# Patient Record
Sex: MALE | Race: WHITE | NOT HISPANIC OR LATINO | URBAN - METROPOLITAN AREA
[De-identification: names, ages, dates, MRNs, and addresses within clinical notes are randomized per-mention and may not be internally consistent; named-entity substitution may affect disease eponyms.]

---

## 2023-07-17 ENCOUNTER — OFFICE VISIT (OUTPATIENT)
Dept: PRIMARY CARE | Facility: CLINIC | Age: 58
End: 2023-07-17
Payer: COMMERCIAL

## 2023-07-17 ENCOUNTER — LAB (OUTPATIENT)
Dept: LAB | Facility: LAB | Age: 58
End: 2023-07-17
Payer: COMMERCIAL

## 2023-07-17 VITALS
SYSTOLIC BLOOD PRESSURE: 126 MMHG | BODY MASS INDEX: 24.64 KG/M2 | WEIGHT: 176 LBS | DIASTOLIC BLOOD PRESSURE: 74 MMHG | HEIGHT: 71 IN

## 2023-07-17 DIAGNOSIS — Z00.00 HEALTH CARE MAINTENANCE: ICD-10-CM

## 2023-07-17 DIAGNOSIS — Z12.5 ENCOUNTER FOR PROSTATE CANCER SCREENING: ICD-10-CM

## 2023-07-17 DIAGNOSIS — Z87.898 HISTORY OF URINARY FREQUENCY: ICD-10-CM

## 2023-07-17 DIAGNOSIS — I45.10 RBBB: ICD-10-CM

## 2023-07-17 PROBLEM — R19.7 DIARRHEA: Status: ACTIVE | Noted: 2023-07-17

## 2023-07-17 PROBLEM — S20.219A RIB CONTUSION: Status: ACTIVE | Noted: 2023-07-17

## 2023-07-17 PROBLEM — R10.9 ABDOMINAL PAIN: Status: ACTIVE | Noted: 2023-07-17

## 2023-07-17 PROBLEM — R53.83 FATIGUE: Status: ACTIVE | Noted: 2023-07-17

## 2023-07-17 PROBLEM — J20.9 ACUTE BRONCHITIS: Status: ACTIVE | Noted: 2023-07-17

## 2023-07-17 PROBLEM — R91.1 LUNG NODULE: Status: ACTIVE | Noted: 2023-07-17

## 2023-07-17 LAB
ALANINE AMINOTRANSFERASE (SGPT) (U/L) IN SER/PLAS: 14 U/L (ref 10–52)
ALBUMIN (G/DL) IN SER/PLAS: 4.7 G/DL (ref 3.4–5)
ALKALINE PHOSPHATASE (U/L) IN SER/PLAS: 75 U/L (ref 33–120)
ANION GAP IN SER/PLAS: 13 MMOL/L (ref 10–20)
APPEARANCE, URINE: CLEAR
ASPARTATE AMINOTRANSFERASE (SGOT) (U/L) IN SER/PLAS: 11 U/L (ref 9–39)
BILIRUBIN TOTAL (MG/DL) IN SER/PLAS: 0.6 MG/DL (ref 0–1.2)
BILIRUBIN, URINE: NEGATIVE
BLOOD, URINE: ABNORMAL
CALCIDIOL (25 OH VITAMIN D3) (NG/ML) IN SER/PLAS: 17 NG/ML
CALCIUM (MG/DL) IN SER/PLAS: 9.8 MG/DL (ref 8.6–10.6)
CARBON DIOXIDE, TOTAL (MMOL/L) IN SER/PLAS: 28 MMOL/L (ref 21–32)
CHLORIDE (MMOL/L) IN SER/PLAS: 105 MMOL/L (ref 98–107)
CHOLESTEROL (MG/DL) IN SER/PLAS: 146 MG/DL (ref 0–199)
CHOLESTEROL IN HDL (MG/DL) IN SER/PLAS: 50.5 MG/DL
CHOLESTEROL/HDL RATIO: 2.9
COBALAMIN (VITAMIN B12) (PG/ML) IN SER/PLAS: 276 PG/ML (ref 211–911)
COLOR, URINE: YELLOW
CREATININE (MG/DL) IN SER/PLAS: 0.92 MG/DL (ref 0.5–1.3)
ERYTHROCYTE DISTRIBUTION WIDTH (RATIO) BY AUTOMATED COUNT: 12.1 % (ref 11.5–14.5)
ERYTHROCYTE MEAN CORPUSCULAR HEMOGLOBIN CONCENTRATION (G/DL) BY AUTOMATED: 32.3 G/DL (ref 32–36)
ERYTHROCYTE MEAN CORPUSCULAR VOLUME (FL) BY AUTOMATED COUNT: 87 FL (ref 80–100)
ERYTHROCYTES (10*6/UL) IN BLOOD BY AUTOMATED COUNT: 5.7 X10E12/L (ref 4.5–5.9)
GFR MALE: >90 ML/MIN/1.73M2
GLUCOSE (MG/DL) IN SER/PLAS: 96 MG/DL (ref 74–99)
GLUCOSE, URINE: NEGATIVE MG/DL
HEMATOCRIT (%) IN BLOOD BY AUTOMATED COUNT: 49.8 % (ref 41–52)
HEMOGLOBIN (G/DL) IN BLOOD: 16.1 G/DL (ref 13.5–17.5)
KETONES, URINE: NEGATIVE MG/DL
LDL: 78 MG/DL (ref 0–99)
LEUKOCYTE ESTERASE, URINE: NEGATIVE
LEUKOCYTES (10*3/UL) IN BLOOD BY AUTOMATED COUNT: 11 X10E9/L (ref 4.4–11.3)
MUCUS, URINE: NORMAL /LPF
NITRITE, URINE: NEGATIVE
NRBC (PER 100 WBCS) BY AUTOMATED COUNT: 0 /100 WBC (ref 0–0)
PH, URINE: 5 (ref 5–8)
PLATELETS (10*3/UL) IN BLOOD AUTOMATED COUNT: 228 X10E9/L (ref 150–450)
POTASSIUM (MMOL/L) IN SER/PLAS: 4.8 MMOL/L (ref 3.5–5.3)
PROSTATE SPECIFIC ANTIGEN,SCREEN: 1.6 NG/ML (ref 0–4)
PROTEIN TOTAL: 7.4 G/DL (ref 6.4–8.2)
PROTEIN, URINE: NEGATIVE MG/DL
RBC, URINE: 1 /HPF (ref 0–5)
SODIUM (MMOL/L) IN SER/PLAS: 141 MMOL/L (ref 136–145)
SPECIFIC GRAVITY, URINE: 1.01 (ref 1–1.03)
TRIGLYCERIDE (MG/DL) IN SER/PLAS: 88 MG/DL (ref 0–149)
UREA NITROGEN (MG/DL) IN SER/PLAS: 10 MG/DL (ref 6–23)
UROBILINOGEN, URINE: <2 MG/DL (ref 0–1.9)
VLDL: 18 MG/DL (ref 0–40)
WBC, URINE: 1 /HPF (ref 0–5)

## 2023-07-17 PROCEDURE — 93000 ELECTROCARDIOGRAM COMPLETE: CPT | Performed by: INTERNAL MEDICINE

## 2023-07-17 PROCEDURE — 36415 COLL VENOUS BLD VENIPUNCTURE: CPT

## 2023-07-17 PROCEDURE — 80061 LIPID PANEL: CPT

## 2023-07-17 PROCEDURE — 87086 URINE CULTURE/COLONY COUNT: CPT

## 2023-07-17 PROCEDURE — 82607 VITAMIN B-12: CPT

## 2023-07-17 PROCEDURE — 81001 URINALYSIS AUTO W/SCOPE: CPT

## 2023-07-17 PROCEDURE — 80053 COMPREHEN METABOLIC PANEL: CPT

## 2023-07-17 PROCEDURE — 99396 PREV VISIT EST AGE 40-64: CPT | Performed by: INTERNAL MEDICINE

## 2023-07-17 PROCEDURE — 82306 VITAMIN D 25 HYDROXY: CPT

## 2023-07-17 PROCEDURE — 85027 COMPLETE CBC AUTOMATED: CPT

## 2023-07-17 PROCEDURE — 84153 ASSAY OF PSA TOTAL: CPT

## 2023-07-17 NOTE — PROGRESS NOTES
"Subjective   Patient ID: Junior Fontanez is a 57 y.o. male who presents for cpe.    HPI   Patient here for physical        PMH : tonsillectomy  SH: smoker , no etoh  FH : M CANCER , F NHL , GF DM   Review of Systems    Objective   /74   Ht 1.791 m (5' 10.5\")   Wt 79.8 kg (176 lb)   BMI 24.90 kg/m²     Physical Exam  Vitals reviewed.   Constitutional:       Appearance: Normal appearance.   HENT:      Head: Normocephalic and atraumatic.      Right Ear: Tympanic membrane, ear canal and external ear normal.      Left Ear: Tympanic membrane, ear canal and external ear normal.      Nose: Nose normal.      Mouth/Throat:      Pharynx: Oropharynx is clear.   Eyes:      Extraocular Movements: Extraocular movements intact.      Conjunctiva/sclera: Conjunctivae normal.      Pupils: Pupils are equal, round, and reactive to light.   Cardiovascular:      Rate and Rhythm: Normal rate and regular rhythm.      Pulses: Normal pulses.      Heart sounds: Normal heart sounds.   Pulmonary:      Effort: Pulmonary effort is normal.      Breath sounds: Normal breath sounds.   Abdominal:      General: Abdomen is flat. Bowel sounds are normal.      Palpations: Abdomen is soft.   Musculoskeletal:      Cervical back: Normal range of motion and neck supple.   Skin:     General: Skin is warm and dry.   Neurological:      General: No focal deficit present.      Mental Status: He is alert and oriented to person, place, and time.   Psychiatric:         Mood and Affect: Mood normal.         Assessment/Plan   Problem List Items Addressed This Visit    None  Visit Diagnoses       Health care maintenance        Relevant Orders    ECG 12 lead    Urine Culture    Urinalysis with Reflex Microscopic    CBC (Completed)    Comprehensive Metabolic Panel    Lipid Panel    Vitamin D, Total    Vitamin B12    Prostate Specific Antigen, Screen    Transthoracic echo (TTE) complete    CT lung screening low dose    CT cardiac scoring wo IV contrast    " Guillermo is doing great. Keep up the good work! Here are just a few things to remember:    1. Encourage family exercise (walking, swimming, biking). Maintain regular family routines (meals, daily reading).  2. Read together every day. Go to the library. Limit TV, videos, and phone/tablet to no more than 1-2 hours per day.  3. Build independence by offering choices between 2 acceptable alternatives.  4. Encourage toilet-training success by dressing your child in easy-to-remove clothes, establishing daily routine, placing on potty every 1-2 hours, and praising attempts.  5. Stay within an arm's reach near water, bathtubs, pools, and the toilet. Supervise your child outside, especially around cars. Use a bike helmet.    Check out the online resource center available on the [facility] website for a wealth of information on pediatric health topics!  [email or web address]    We'll plan to see you again at your child's 2-year visit. Feel free to call us before your next appointment if you have any questions or concerns.    Encounter for prostate cancer screening        Relevant Orders    ECG 12 lead    Urine Culture    Urinalysis with Reflex Microscopic    CBC (Completed)    Comprehensive Metabolic Panel    Lipid Panel    Vitamin D, Total    Vitamin B12    Prostate Specific Antigen, Screen    History of urinary frequency        Relevant Orders    Urine Culture    Urinalysis with Reflex Microscopic    RBBB        Relevant Orders    Transthoracic echo (TTE) complete    CT lung screening low dose    CT cardiac scoring wo IV contrast             Past recap   physical normal  Blood work ordered  Tdap not available follow-up to get it  Scheduled for an eye examination next week  Had colonoscopy at 49  Discussed diet exercise and healthy lifestyle    7/17/2023  Physical normal   EKG done shows right bundle branch block  Discussed with the patient that he needs to rule out sleep apnea needs rule out lung issues  We will do CT cardiac scoring  We will do CT lung cancer screening as he had lung nodules in the past  Check 2D echo  Routine blood work ordered  Encouraged to quit smoking  Follow-up after the test

## 2023-07-18 LAB — URINE CULTURE: NORMAL

## 2023-07-20 ENCOUNTER — TELEPHONE (OUTPATIENT)
Dept: PRIMARY CARE | Facility: CLINIC | Age: 58
End: 2023-07-20
Payer: COMMERCIAL

## 2023-07-21 ENCOUNTER — TELEPHONE (OUTPATIENT)
Dept: PRIMARY CARE | Facility: CLINIC | Age: 58
End: 2023-07-21
Payer: COMMERCIAL

## 2023-08-14 ENCOUNTER — TELEPHONE (OUTPATIENT)
Dept: PRIMARY CARE | Facility: CLINIC | Age: 58
End: 2023-08-14
Payer: COMMERCIAL

## 2023-08-15 ENCOUNTER — TELEPHONE (OUTPATIENT)
Dept: PRIMARY CARE | Facility: CLINIC | Age: 58
End: 2023-08-15
Payer: COMMERCIAL

## 2023-08-26 ENCOUNTER — OFFICE VISIT (OUTPATIENT)
Dept: PRIMARY CARE | Facility: CLINIC | Age: 58
End: 2023-08-26
Payer: COMMERCIAL

## 2023-08-26 VITALS
SYSTOLIC BLOOD PRESSURE: 134 MMHG | BODY MASS INDEX: 25.2 KG/M2 | DIASTOLIC BLOOD PRESSURE: 90 MMHG | WEIGHT: 176 LBS | HEIGHT: 70 IN

## 2023-08-26 DIAGNOSIS — N20.0 KIDNEY STONE: ICD-10-CM

## 2023-08-26 DIAGNOSIS — R53.82 CHRONIC FATIGUE: ICD-10-CM

## 2023-08-26 DIAGNOSIS — Z71.89 ENCOUNTER FOR COUNSELING FOR SLEEP APNEA: ICD-10-CM

## 2023-08-26 DIAGNOSIS — G47.30 ENCOUNTER FOR COUNSELING FOR SLEEP APNEA: ICD-10-CM

## 2023-08-26 DIAGNOSIS — R06.09 DOE (DYSPNEA ON EXERTION): Primary | ICD-10-CM

## 2023-08-26 DIAGNOSIS — I45.10 RIGHT BUNDLE BRANCH BLOCK: ICD-10-CM

## 2023-08-26 PROBLEM — I44.7 LEFT BUNDLE BRANCH BLOCK: Status: ACTIVE | Noted: 2023-08-26

## 2023-08-26 PROCEDURE — 99214 OFFICE O/P EST MOD 30 MIN: CPT | Performed by: INTERNAL MEDICINE

## 2023-08-26 NOTE — PROGRESS NOTES
"Subjective   Patient ID: Junior Fontanez is a 57 y.o. male who presents for Follow-up (results).    HPI   Patient is here for follow-up on 2D echo follow-up on blood work   Follow-up on lung CT  He has quit smoking for now 34 days  Complains of feeling fatigued gets lack of energy shortness of breath numbness and tingling in the hands and arms      patient here for physical     PMH : tonsillectomy, left bundle branch block  SH: smoker , no etoh  FH : M CANCER , F NHL , GF DM   Review of Systems    Objective   /90   Ht 1.778 m (5' 10\")   Wt 79.8 kg (176 lb)   BMI 25.25 kg/m²     Physical Exam  Vitals reviewed.   Constitutional:       Appearance: Normal appearance.   HENT:      Head: Normocephalic and atraumatic.      Right Ear: Tympanic membrane, ear canal and external ear normal.      Left Ear: Tympanic membrane, ear canal and external ear normal.      Nose: Nose normal.      Mouth/Throat:      Pharynx: Oropharynx is clear.   Eyes:      Extraocular Movements: Extraocular movements intact.      Conjunctiva/sclera: Conjunctivae normal.      Pupils: Pupils are equal, round, and reactive to light.   Cardiovascular:      Rate and Rhythm: Normal rate and regular rhythm.      Pulses: Normal pulses.      Heart sounds: Normal heart sounds.   Pulmonary:      Effort: Pulmonary effort is normal.      Breath sounds: Normal breath sounds.   Abdominal:      General: Abdomen is flat. Bowel sounds are normal.      Palpations: Abdomen is soft.   Musculoskeletal:      Cervical back: Normal range of motion and neck supple.   Skin:     General: Skin is warm and dry.   Neurological:      General: No focal deficit present.      Mental Status: He is alert and oriented to person, place, and time.   Psychiatric:         Mood and Affect: Mood normal.         Assessment/Plan   Problem List Items Addressed This Visit          Cardiac and Vasculature    Left bundle branch block       Genitourinary and Reproductive    Kidney stone    "    Sleep    Encounter for counseling for sleep apnea    Relevant Orders    In-Center Sleep Study (Non-Sleep Provider Only)       Symptoms and Signs    Fatigue     Other Visit Diagnoses       QUINTERO (dyspnea on exertion)    -  Primary    Relevant Orders    Nuclear Stress Test    Referral to Cardiology          Past recap   physical normal  Blood work ordered  Tdap not available follow-up to get it  Scheduled for an eye examination next week  Had colonoscopy at 49  Discussed diet exercise and healthy lifestyle     7/17/2023  Physical normal   EKG done shows right bundle branch block  Discussed with the patient that he needs to rule out sleep apnea needs rule out lung issues  We will do CT cardiac scoring  We will do CT lung cancer screening as he had lung nodules in the past  Check 2D echo  Routine blood work ordered  Encouraged to quit smoking  Follow-up after the test    8/26/2023  2D echo shows moderate pulmonary regurg slightly reduced ejection fraction  That could explain right bundle branch block  Advised patient to go for sleep study  CAT scan shows emphysema and upper lung fields congratulated for quitting smoking  We will do nuclear pharmacological stress test  Repeat blood pressure 120/72  Will refer to cardiology for further evaluation

## 2023-09-08 ENCOUNTER — HOSPITAL ENCOUNTER (OUTPATIENT)
Dept: DATA CONVERSION | Facility: HOSPITAL | Age: 58
End: 2023-09-08

## 2023-09-08 DIAGNOSIS — G47.30 SLEEP APNEA, UNSPECIFIED: ICD-10-CM

## 2023-10-19 ENCOUNTER — CLINICAL SUPPORT (OUTPATIENT)
Dept: SLEEP MEDICINE | Facility: HOSPITAL | Age: 58
End: 2023-10-19
Payer: COMMERCIAL

## 2023-10-19 DIAGNOSIS — G47.33 OBSTRUCTIVE SLEEP APNEA (ADULT) (PEDIATRIC): ICD-10-CM

## 2023-10-19 DIAGNOSIS — G47.30 ENCOUNTER FOR COUNSELING FOR SLEEP APNEA: ICD-10-CM

## 2023-10-19 DIAGNOSIS — Z71.89 ENCOUNTER FOR COUNSELING FOR SLEEP APNEA: ICD-10-CM

## 2023-10-19 DIAGNOSIS — G47.61 PERIODIC LIMB MOVEMENT DISORDER: ICD-10-CM

## 2023-10-19 PROCEDURE — 95810 POLYSOM 6/> YRS 4/> PARAM: CPT | Performed by: INTERNAL MEDICINE

## 2023-10-20 NOTE — PROGRESS NOTES
[facility] TECH NOTE:     Patient: Junior Fontanez   MRN//AGE: [id number]  1965  58 y.o.   Technologist: Jayro Middleton RRT, RPSGT   Room: 4   Service Date: 10/19/2023        Sleep Testing Location: [facility]    [place]: 4    TECHNOLOGIST SLEEP STUDY PROCEDURE NOTE:   This sleep study is being conducted according to the policies and procedures outlined by the AAS accreditation standards.  The sleep study procedure and processes involved during this appointment was explained to the patient/patient’s family, questions were answered. The patient/family verbalized understanding.      The patient is a 58 y.o. year old male scheduled for a Diagnostic PSG Split night with montage of:  Diagnostuc PSG . he arrived for his appointment.      The study that was ultimately completed was a  Diagnostic PSG .    The full study Was completed.  Patient questionnaires completed?: yes     Consents signed? yes    Initial Fall Risk Screening:     Junior has not fallen in the last 6 months. his did not result in injury. Junior does not have a fear of falling. He does not need assistance with sitting, standing, or walking. he does not need assistance walking in his home. he does not need assistance in an unfamiliar setting. The patient is notusing an assistive device.     Brief Study observations: REM related obstructive hypopneas are primarily observed. Patient did not meet criteria for titration due to AHI being <15.     Deviation to order/protocol and reason: No      If PAP, which was preferred mask/pressure/mode: NA      Other:None    After the procedure, the patient/family was informed to ensure followup with ordering clinician for testing results.      Technologist: Jayro Middleton RRT  [facility] [employer] NOTE:     Patient: Junior Fontanez   MRN//AGE: [id number]  1965  58 y.o.   Technologist: Jayro Middleton RRT   Room: ***   Service Date: 10/20/2023        Sleep Testing Location: ***    [place]: No data recorded    TECHNOLOGIST SLEEP  STUDY PROCEDURE NOTE:   This sleep study is being conducted according to the policies and procedures outlined by the AAS accreditation standards.  The sleep study procedure and processes involved during this appointment was explained to the patient/patient’s family, questions were answered. The patient/family verbalized understanding.      The patient is a 58 y.o. year old male scheduled for a{Study Type:63947} with montage of: {Sleep Montage:99797}. he arrived for his appointment.      The study that was ultimately completed was a{Study Type:18161} with montage of: {Sleep Montage:19857}.    The full study {WAS/WAS NOT:82308} completed.  Patient questionnaires completed?: {yes/no:62279}     Consents signed? {yes/no:19471}    Initial Fall Risk Screening:     Junior {has/has not:64997} fallen in the last 6 months. his {DID/DID NOT:30700} result in injury. Junior {DOES/DOES NOT:88633} have a fear of falling. He {DOES/DOES NOT:77861} need assistance with sitting, standing, or walking. he {DOES/DOES NOT:39688} need assistance walking in his home. he {DOES/DOES NOT:26285} need assistance in an unfamiliar setting. The patient {is/is not:99594}using an assistive device.     Brief Study observations: ***     Deviation to order/protocol and reason: ***      If PAP, which was preferred mask/pressure/mode: ***      Other:{Other:10567}    After the procedure, the patient/family was informed to ensure followup with ordering clinician for testing results.      Technologist: Jayro Middleton RRT

## 2023-11-02 ENCOUNTER — TELEPHONE (OUTPATIENT)
Dept: PRIMARY CARE | Facility: CLINIC | Age: 58
End: 2023-11-02
Payer: COMMERCIAL

## 2023-11-13 ENCOUNTER — APPOINTMENT (OUTPATIENT)
Dept: RADIOLOGY | Facility: CLINIC | Age: 58
End: 2023-11-13
Payer: COMMERCIAL

## 2023-11-18 ENCOUNTER — OFFICE VISIT (OUTPATIENT)
Dept: PRIMARY CARE | Facility: CLINIC | Age: 58
End: 2023-11-18
Payer: COMMERCIAL

## 2023-11-18 VITALS
DIASTOLIC BLOOD PRESSURE: 64 MMHG | WEIGHT: 176 LBS | BODY MASS INDEX: 25.2 KG/M2 | SYSTOLIC BLOOD PRESSURE: 126 MMHG | HEIGHT: 70 IN

## 2023-11-18 DIAGNOSIS — E55.9 VITAMIN D DEFICIENCY: ICD-10-CM

## 2023-11-18 DIAGNOSIS — J43.9 PULMONARY EMPHYSEMA, UNSPECIFIED EMPHYSEMA TYPE (MULTI): ICD-10-CM

## 2023-11-18 DIAGNOSIS — G47.33 MILD OBSTRUCTIVE SLEEP APNEA: Primary | ICD-10-CM

## 2023-11-18 PROCEDURE — 99213 OFFICE O/P EST LOW 20 MIN: CPT | Performed by: INTERNAL MEDICINE

## 2023-11-18 NOTE — PROGRESS NOTES
"Subjective   Patient ID: Junior Fontanez is a 58 y.o. male who presents for Follow-up (results).    HPI   Patient is here for follow-up on 2D echo follow-up on blood work   Follow-up on lung CT  He has quit smoking for now 34 days  Complains of feeling fatigued gets lack of energy shortness of breath numbness and tingling in the hands and arms      patient here for physical     PMH : tonsillectomy, left bundle branch block  SH: smoker , no etoh  FH : M CANCER , F NHL , GF DM   Review of Systems    Objective   /64   Ht 1.778 m (5' 10\")   Wt 79.8 kg (176 lb)   BMI 25.25 kg/m²     Physical Exam  Vitals reviewed.   Constitutional:       Appearance: Normal appearance.   HENT:      Head: Normocephalic and atraumatic.      Right Ear: Tympanic membrane, ear canal and external ear normal.      Left Ear: Tympanic membrane, ear canal and external ear normal.      Nose: Nose normal.      Mouth/Throat:      Pharynx: Oropharynx is clear.   Eyes:      Extraocular Movements: Extraocular movements intact.      Conjunctiva/sclera: Conjunctivae normal.      Pupils: Pupils are equal, round, and reactive to light.   Cardiovascular:      Rate and Rhythm: Normal rate and regular rhythm.      Pulses: Normal pulses.      Heart sounds: Normal heart sounds.   Pulmonary:      Effort: Pulmonary effort is normal.      Breath sounds: Normal breath sounds.   Abdominal:      General: Abdomen is flat. Bowel sounds are normal.      Palpations: Abdomen is soft.   Musculoskeletal:      Cervical back: Normal range of motion and neck supple.   Skin:     General: Skin is warm and dry.   Neurological:      General: No focal deficit present.      Mental Status: He is alert and oriented to person, place, and time.   Psychiatric:         Mood and Affect: Mood normal.         Assessment/Plan   Problem List Items Addressed This Visit    None  Visit Diagnoses       Mild obstructive sleep apnea    -  Primary    Vitamin D deficiency        Pulmonary " emphysema, unspecified emphysema type (CMS/HCC)            Past recap   physical normal  Blood work ordered  Tdap not available follow-up to get it  Scheduled for an eye examination next week  Had colonoscopy at 49  Discussed diet exercise and healthy lifestyle     7/17/2023  Physical normal   EKG done shows right bundle branch block  Discussed with the patient that he needs to rule out sleep apnea needs rule out lung issues  We will do CT cardiac scoring  We will do CT lung cancer screening as he had lung nodules in the past  Check 2D echo  Routine blood work ordered  Encouraged to quit smoking  Follow-up after the test    8/26/2023  2D echo shows moderate pulmonary regurg slightly reduced ejection fraction  That could explain right bundle branch block  Advised patient to go for sleep study  CAT scan shows emphysema and upper lung fields congratulated for quitting smoking  We will do nuclear pharmacological stress test  Repeat blood pressure 120/72  Will refer to cardiology for further evaluation    11/18/2023  Blood work reviewed  Stress test normal  Mild obstructive sleep apnea  Vitamin D low at 17  Vitamin D supplement started  Mild upper lobe emphysema  Patient has quit smoking,  Follow-up for routine physical next year

## 2024-07-20 ENCOUNTER — LAB (OUTPATIENT)
Dept: LAB | Facility: LAB | Age: 59
End: 2024-07-20
Payer: COMMERCIAL

## 2024-07-20 ENCOUNTER — APPOINTMENT (OUTPATIENT)
Dept: PRIMARY CARE | Facility: CLINIC | Age: 59
End: 2024-07-20
Payer: COMMERCIAL

## 2024-07-20 VITALS
DIASTOLIC BLOOD PRESSURE: 80 MMHG | HEIGHT: 70 IN | BODY MASS INDEX: 26.34 KG/M2 | WEIGHT: 184 LBS | SYSTOLIC BLOOD PRESSURE: 122 MMHG

## 2024-07-20 DIAGNOSIS — R53.83 OTHER FATIGUE: ICD-10-CM

## 2024-07-20 DIAGNOSIS — Z13.220 LIPID SCREENING: ICD-10-CM

## 2024-07-20 DIAGNOSIS — Z12.5 ENCOUNTER FOR PROSTATE CANCER SCREENING: ICD-10-CM

## 2024-07-20 DIAGNOSIS — Z00.00 ANNUAL PHYSICAL EXAM: Primary | ICD-10-CM

## 2024-07-20 DIAGNOSIS — E55.9 VITAMIN D DEFICIENCY: ICD-10-CM

## 2024-07-20 DIAGNOSIS — R20.0 NUMBNESS: ICD-10-CM

## 2024-07-20 LAB
25(OH)D3 SERPL-MCNC: 16 NG/ML (ref 30–100)
ALBUMIN SERPL BCP-MCNC: 4.5 G/DL (ref 3.4–5)
ALP SERPL-CCNC: 69 U/L (ref 33–120)
ALT SERPL W P-5'-P-CCNC: 16 U/L (ref 10–52)
ANION GAP SERPL CALC-SCNC: 13 MMOL/L (ref 10–20)
AST SERPL W P-5'-P-CCNC: 14 U/L (ref 9–39)
BILIRUB SERPL-MCNC: 0.6 MG/DL (ref 0–1.2)
BUN SERPL-MCNC: 12 MG/DL (ref 6–23)
CALCIUM SERPL-MCNC: 9.5 MG/DL (ref 8.6–10.6)
CHLORIDE SERPL-SCNC: 103 MMOL/L (ref 98–107)
CHOLEST SERPL-MCNC: 162 MG/DL (ref 0–199)
CHOLESTEROL/HDL RATIO: 3
CO2 SERPL-SCNC: 28 MMOL/L (ref 21–32)
CREAT SERPL-MCNC: 0.89 MG/DL (ref 0.5–1.3)
EGFRCR SERPLBLD CKD-EPI 2021: >90 ML/MIN/1.73M*2
ERYTHROCYTE [DISTWIDTH] IN BLOOD BY AUTOMATED COUNT: 11.9 % (ref 11.5–14.5)
GLUCOSE SERPL-MCNC: 94 MG/DL (ref 74–99)
HCT VFR BLD AUTO: 46.3 % (ref 41–52)
HDLC SERPL-MCNC: 53.3 MG/DL
HGB BLD-MCNC: 15.1 G/DL (ref 13.5–17.5)
LDLC SERPL CALC-MCNC: 95 MG/DL
MCH RBC QN AUTO: 27.9 PG (ref 26–34)
MCHC RBC AUTO-ENTMCNC: 32.6 G/DL (ref 32–36)
MCV RBC AUTO: 86 FL (ref 80–100)
NON HDL CHOLESTEROL: 109 MG/DL (ref 0–149)
NRBC BLD-RTO: 0 /100 WBCS (ref 0–0)
PLATELET # BLD AUTO: 223 X10*3/UL (ref 150–450)
POTASSIUM SERPL-SCNC: 4.4 MMOL/L (ref 3.5–5.3)
PROT SERPL-MCNC: 7.6 G/DL (ref 6.4–8.2)
PSA SERPL-MCNC: 1.63 NG/ML
RBC # BLD AUTO: 5.41 X10*6/UL (ref 4.5–5.9)
SODIUM SERPL-SCNC: 140 MMOL/L (ref 136–145)
TRIGL SERPL-MCNC: 70 MG/DL (ref 0–149)
TSH SERPL-ACNC: 0.76 MIU/L (ref 0.44–3.98)
VLDL: 14 MG/DL (ref 0–40)
WBC # BLD AUTO: 8.9 X10*3/UL (ref 4.4–11.3)

## 2024-07-20 PROCEDURE — 80061 LIPID PANEL: CPT

## 2024-07-20 PROCEDURE — 99213 OFFICE O/P EST LOW 20 MIN: CPT | Performed by: INTERNAL MEDICINE

## 2024-07-20 PROCEDURE — 99396 PREV VISIT EST AGE 40-64: CPT | Performed by: INTERNAL MEDICINE

## 2024-07-20 PROCEDURE — 84153 ASSAY OF PSA TOTAL: CPT

## 2024-07-20 PROCEDURE — 3008F BODY MASS INDEX DOCD: CPT | Performed by: INTERNAL MEDICINE

## 2024-07-20 PROCEDURE — 84443 ASSAY THYROID STIM HORMONE: CPT

## 2024-07-20 PROCEDURE — 85027 COMPLETE CBC AUTOMATED: CPT

## 2024-07-20 PROCEDURE — 80053 COMPREHEN METABOLIC PANEL: CPT

## 2024-07-20 PROCEDURE — 36415 COLL VENOUS BLD VENIPUNCTURE: CPT

## 2024-07-20 PROCEDURE — 82306 VITAMIN D 25 HYDROXY: CPT

## 2024-07-20 ASSESSMENT — ENCOUNTER SYMPTOMS
OCCASIONAL FEELINGS OF UNSTEADINESS: 0
LOSS OF SENSATION IN FEET: 0
DEPRESSION: 0

## 2024-07-20 NOTE — PROGRESS NOTES
"Subjective   Patient ID: Junior Fontanez is a 58 y.o. male who presents for Annual Exam.    HPI   Patient is here for physical  Complaining of numbness in the right arm and the right hand and complaining of numbness in the right foot sometimes dragging  Colonoscopy due next year    Patient is here for follow-up on 2D echo follow-up on blood work   Follow-up on lung CT  He has quit smoking for now 34 days  Complains of feeling fatigued gets lack of energy shortness of breath numbness and tingling in the hands and arms      patient here for physical     PMH : tonsillectomy, left bundle branch block, scoliosis  SH: smoker , no etoh  FH : M CANCER , F NHL , GF DM   Review of Systems    Objective   /80   Ht 1.778 m (5' 10\")   Wt 83.5 kg (184 lb)   BMI 26.40 kg/m²     Physical Exam  Vitals reviewed.   Constitutional:       Appearance: Normal appearance.   HENT:      Head: Normocephalic and atraumatic.      Right Ear: Tympanic membrane, ear canal and external ear normal.      Left Ear: Tympanic membrane, ear canal and external ear normal.      Nose: Nose normal.      Mouth/Throat:      Pharynx: Oropharynx is clear.   Eyes:      Extraocular Movements: Extraocular movements intact.      Conjunctiva/sclera: Conjunctivae normal.      Pupils: Pupils are equal, round, and reactive to light.   Cardiovascular:      Rate and Rhythm: Normal rate and regular rhythm.      Pulses: Normal pulses.      Heart sounds: Normal heart sounds.   Pulmonary:      Effort: Pulmonary effort is normal.      Breath sounds: Normal breath sounds.   Abdominal:      General: Abdomen is flat. Bowel sounds are normal.      Palpations: Abdomen is soft.   Musculoskeletal:      Cervical back: Normal range of motion and neck supple.      Comments: Scoliosis   Skin:     General: Skin is warm and dry.   Neurological:      General: No focal deficit present.      Mental Status: He is alert and oriented to person, place, and time.   Psychiatric:         " Mood and Affect: Mood normal.         Assessment/Plan   Problem List Items Addressed This Visit          Symptoms and Signs    Fatigue    Relevant Orders    CBC (Completed)    Thyroid Stimulating Hormone (Completed)     Other Visit Diagnoses       Annual physical exam    -  Primary    Vitamin D deficiency        Relevant Orders    Vitamin D 25-Hydroxy,Total (for eval of Vitamin D levels) (Completed)    Encounter for prostate cancer screening        Relevant Orders    Prostate Specific Antigen, Screen (Completed)    Lipid screening        Relevant Orders    Comprehensive Metabolic Panel (Completed)    Lipid Panel (Completed)    Numbness        Relevant Orders    EMG & nerve conduction          Past recap   physical normal  Blood work ordered  Tdap not available follow-up to get it  Scheduled for an eye examination next week  Had colonoscopy at 49  Discussed diet exercise and healthy lifestyle     7/17/2023  Physical normal   EKG done shows right bundle branch block  Discussed with the patient that he needs to rule out sleep apnea needs rule out lung issues  We will do CT cardiac scoring  We will do CT lung cancer screening as he had lung nodules in the past  Check 2D echo  Routine blood work ordered  Encouraged to quit smoking  Follow-up after the test    8/26/2023  2D echo shows moderate pulmonary regurg slightly reduced ejection fraction  That could explain right bundle branch block  Advised patient to go for sleep study  CAT scan shows emphysema and upper lung fields congratulated for quitting smoking  We will do nuclear pharmacological stress test  Repeat blood pressure 120/72  Will refer to cardiology for further evaluation    11/18/2023  Blood work reviewed  Stress test normal  Mild obstructive sleep apnea  Vitamin D low at 17  Vitamin D supplement started  Mild upper lobe emphysema  Patient has quit smoking,  Follow-up for routine physical next year    7/20/2024  Physical normal  Routine blood work ordered CBC  be fasting with TSH vitamin D was low before  Check PSA  Patient's symptoms of right arm numbness could be related to scoliosis  Will do EMG right arm and right leg to evaluate further  Follow-up after blood work and EMG

## 2024-07-27 ENCOUNTER — OFFICE VISIT (OUTPATIENT)
Dept: PRIMARY CARE | Facility: CLINIC | Age: 59
End: 2024-07-27
Payer: COMMERCIAL

## 2024-07-27 VITALS
WEIGHT: 183 LBS | SYSTOLIC BLOOD PRESSURE: 124 MMHG | HEIGHT: 70 IN | DIASTOLIC BLOOD PRESSURE: 78 MMHG | BODY MASS INDEX: 26.2 KG/M2

## 2024-07-27 DIAGNOSIS — M54.41 ACUTE BILATERAL LOW BACK PAIN WITH BILATERAL SCIATICA: ICD-10-CM

## 2024-07-27 DIAGNOSIS — M54.42 ACUTE BILATERAL LOW BACK PAIN WITH BILATERAL SCIATICA: ICD-10-CM

## 2024-07-27 DIAGNOSIS — E55.9 VITAMIN D DEFICIENCY: ICD-10-CM

## 2024-07-27 DIAGNOSIS — M54.2 NECK PAIN: ICD-10-CM

## 2024-07-27 DIAGNOSIS — R20.0 NUMBNESS: Primary | ICD-10-CM

## 2024-07-27 PROBLEM — R06.09 DYSPNEA ON EXERTION: Status: ACTIVE | Noted: 2024-07-27

## 2024-07-27 PROBLEM — I45.2 RIGHT BUNDLE BRANCH BLOCK (RBBB) WITH LEFT ANTERIOR FASCICULAR BLOCK (LAFB): Status: ACTIVE | Noted: 2024-07-27

## 2024-07-27 PROCEDURE — 3008F BODY MASS INDEX DOCD: CPT | Performed by: INTERNAL MEDICINE

## 2024-07-27 PROCEDURE — 99214 OFFICE O/P EST MOD 30 MIN: CPT | Performed by: INTERNAL MEDICINE

## 2024-07-27 RX ORDER — ERGOCALCIFEROL 1.25 MG/1
50000 CAPSULE ORAL
Qty: 13 CAPSULE | Refills: 0 | Status: SHIPPED | OUTPATIENT
Start: 2024-07-28 | End: 2024-10-27

## 2024-07-27 ASSESSMENT — ENCOUNTER SYMPTOMS
OCCASIONAL FEELINGS OF UNSTEADINESS: 0
DEPRESSION: 0
LOSS OF SENSATION IN FEET: 0

## 2024-07-27 NOTE — PROGRESS NOTES
"Subjective   Patient ID: Junior Fontanez is a 58 y.o. male who presents for Follow-up (results).    HPI   Patient is here for follow-up on blood work  Feels very tired  Per wife he has OCD and anxiety  Having a lot of pain in the neck and the back  Gets numbness in the arm and the foot    past recap   patient is here for physical  Complaining of numbness in the right arm and the right hand and complaining of numbness in the right foot sometimes dragging  Colonoscopy due next year    Patient is here for follow-up on 2D echo follow-up on blood work   Follow-up on lung CT  He has quit smoking for now 34 days  Complains of feeling fatigued gets lack of energy shortness of breath numbness and tingling in the hands and arms      patient here for physical     PMH : tonsillectomy, left bundle branch block, scoliosis  SH: smoker , no etoh  FH : M CANCER , F NHL , GF DM   Review of Systems    Objective   /78   Ht 1.778 m (5' 10\")   Wt 83 kg (183 lb)   BMI 26.26 kg/m²     Physical Exam  Vitals reviewed.   Constitutional:       Appearance: Normal appearance.   HENT:      Head: Normocephalic and atraumatic.      Right Ear: Tympanic membrane, ear canal and external ear normal.      Left Ear: Tympanic membrane, ear canal and external ear normal.      Nose: Nose normal.      Mouth/Throat:      Pharynx: Oropharynx is clear.   Eyes:      Extraocular Movements: Extraocular movements intact.      Conjunctiva/sclera: Conjunctivae normal.      Pupils: Pupils are equal, round, and reactive to light.   Cardiovascular:      Rate and Rhythm: Normal rate and regular rhythm.      Pulses: Normal pulses.      Heart sounds: Normal heart sounds.   Pulmonary:      Effort: Pulmonary effort is normal.      Breath sounds: Normal breath sounds.   Abdominal:      General: Abdomen is flat. Bowel sounds are normal.      Palpations: Abdomen is soft.   Musculoskeletal:      Cervical back: Normal range of motion and neck supple.      Comments: " Scoliosis   Skin:     General: Skin is warm and dry.   Neurological:      General: No focal deficit present.      Mental Status: He is alert and oriented to person, place, and time.   Psychiatric:         Mood and Affect: Mood normal.         Assessment/Plan   Problem List Items Addressed This Visit          Endocrine/Metabolic    Vitamin D deficiency    Relevant Medications    ergocalciferol (Vitamin D-2) 1.25 MG (94828 UT) capsule     Other Visit Diagnoses       Numbness    -  Primary    Neck pain        Acute bilateral low back pain with bilateral sciatica        Relevant Orders    XR thoracic spine 2 views          Past recap   physical normal  Blood work ordered  Tdap not available follow-up to get it  Scheduled for an eye examination next week  Had colonoscopy at 49  Discussed diet exercise and healthy lifestyle     7/17/2023  Physical normal   EKG done shows right bundle branch block  Discussed with the patient that he needs to rule out sleep apnea needs rule out lung issues  We will do CT cardiac scoring  We will do CT lung cancer screening as he had lung nodules in the past  Check 2D echo  Routine blood work ordered  Encouraged to quit smoking  Follow-up after the test    8/26/2023  2D echo shows moderate pulmonary regurg slightly reduced ejection fraction  That could explain right bundle branch block  Advised patient to go for sleep study  CAT scan shows emphysema and upper lung fields congratulated for quitting smoking  We will do nuclear pharmacological stress test  Repeat blood pressure 120/72  Will refer to cardiology for further evaluation    11/18/2023  Blood work reviewed  Stress test normal  Mild obstructive sleep apnea  Vitamin D low at 17  Vitamin D supplement started  Mild upper lobe emphysema  Patient has quit smoking,  Follow-up for routine physical next year    7/20/2024  Physical normal  Routine blood work ordered CBC be fasting with TSH vitamin D was low before  Check PSA  Patient's  symptoms of right arm numbness could be related to scoliosis  Will do EMG right arm and right leg to evaluate further  Follow-up after blood work and EMG    7/27/2024  Blood work reviewed  All blood work in normal range  No electrolyte deficiency to explain numbness or tingling in the arms and legs  Vitamin D deficiency  Vitamin D 50,000 q. weekly for 3 months then 2000 a day  X-ray C-spine thoracic spine lumbar spine  EMG  Follow-up after the test

## 2024-08-03 ENCOUNTER — HOSPITAL ENCOUNTER (OUTPATIENT)
Dept: RADIOLOGY | Facility: CLINIC | Age: 59
Discharge: HOME | End: 2024-08-03
Payer: COMMERCIAL

## 2024-08-03 DIAGNOSIS — M54.41 ACUTE BILATERAL LOW BACK PAIN WITH BILATERAL SCIATICA: ICD-10-CM

## 2024-08-03 DIAGNOSIS — M54.42 ACUTE BILATERAL LOW BACK PAIN WITH BILATERAL SCIATICA: ICD-10-CM

## 2024-08-03 DIAGNOSIS — M54.2 NECK PAIN: ICD-10-CM

## 2024-08-03 PROBLEM — E55.9 VITAMIN D DEFICIENCY: Status: ACTIVE | Noted: 2024-08-03

## 2024-08-03 PROCEDURE — 72070 X-RAY EXAM THORAC SPINE 2VWS: CPT | Performed by: RADIOLOGY

## 2024-08-03 PROCEDURE — 72110 X-RAY EXAM L-2 SPINE 4/>VWS: CPT | Performed by: RADIOLOGY

## 2024-08-03 PROCEDURE — 72050 X-RAY EXAM NECK SPINE 4/5VWS: CPT

## 2024-08-03 PROCEDURE — 72070 X-RAY EXAM THORAC SPINE 2VWS: CPT

## 2024-08-03 PROCEDURE — 72050 X-RAY EXAM NECK SPINE 4/5VWS: CPT | Performed by: RADIOLOGY

## 2024-08-03 PROCEDURE — 72110 X-RAY EXAM L-2 SPINE 4/>VWS: CPT

## 2024-08-08 ENCOUNTER — HOSPITAL ENCOUNTER (OUTPATIENT)
Dept: NEUROLOGY | Facility: CLINIC | Age: 59
Discharge: HOME | End: 2024-08-08
Payer: COMMERCIAL

## 2024-08-08 DIAGNOSIS — R20.0 NUMBNESS: ICD-10-CM

## 2024-08-08 PROCEDURE — 95886 MUSC TEST DONE W/N TEST COMP: CPT | Performed by: PSYCHIATRY & NEUROLOGY

## 2024-08-08 PROCEDURE — 95913 NRV CNDJ TEST 13/> STUDIES: CPT | Performed by: PSYCHIATRY & NEUROLOGY

## 2024-08-24 ENCOUNTER — APPOINTMENT (OUTPATIENT)
Dept: PRIMARY CARE | Facility: CLINIC | Age: 59
End: 2024-08-24
Payer: COMMERCIAL

## 2024-08-24 VITALS
SYSTOLIC BLOOD PRESSURE: 142 MMHG | DIASTOLIC BLOOD PRESSURE: 70 MMHG | HEIGHT: 70 IN | WEIGHT: 183 LBS | BODY MASS INDEX: 26.2 KG/M2

## 2024-08-24 DIAGNOSIS — R20.0 NUMBNESS OF ARM: ICD-10-CM

## 2024-08-24 DIAGNOSIS — R29.898 RIGHT ARM WEAKNESS: ICD-10-CM

## 2024-08-24 DIAGNOSIS — R29.898 WEAKNESS OF RIGHT LOWER EXTREMITY: Primary | ICD-10-CM

## 2024-08-24 DIAGNOSIS — R20.0 LEG NUMBNESS: ICD-10-CM

## 2024-08-24 PROCEDURE — 99214 OFFICE O/P EST MOD 30 MIN: CPT | Performed by: INTERNAL MEDICINE

## 2024-08-24 PROCEDURE — 1036F TOBACCO NON-USER: CPT | Performed by: INTERNAL MEDICINE

## 2024-08-24 PROCEDURE — 3008F BODY MASS INDEX DOCD: CPT | Performed by: INTERNAL MEDICINE

## 2024-08-24 NOTE — PROGRESS NOTES
"Subjective   Patient ID: Junior Fontanez is a 58 y.o. male who presents for Follow-up (results).    HPI   Patient is here for follow-up on x-ray of C-spine and lumbar spine  Follow-up on EMG test  Patient is getting worsening of the numbness and weakness of the right arm and the right leg.  It started few months ago but progressively is getting worse.  He is limping a little.  The numbness shoots down the right arm.  He denies any trauma to the spine    Past recap  Patient is here for follow-up on blood work  Feels very tired  Per wife he has OCD and anxiety  Having a lot of pain in the neck and the back  Gets numbness in the arm and the foot     patient is here for physical  Complaining of numbness in the right arm and the right hand and complaining of numbness in the right foot sometimes dragging  Colonoscopy due next year    Patient is here for follow-up on 2D echo follow-up on blood work   Follow-up on lung CT  He has quit smoking for now 34 days  Complains of feeling fatigued gets lack of energy shortness of breath numbness and tingling in the hands and arms      patient here for physical     PMH : tonsillectomy, left bundle branch block, scoliosis  SH: smoker , no etoh  FH : M CANCER , F NHL , GF DM   Review of Systems    Objective   /70   Ht 1.778 m (5' 10\")   Wt 83 kg (183 lb)   BMI 26.26 kg/m²     Physical Exam  Vitals reviewed.   Constitutional:       Appearance: Normal appearance.   HENT:      Head: Normocephalic and atraumatic.      Right Ear: Tympanic membrane, ear canal and external ear normal.      Left Ear: Tympanic membrane, ear canal and external ear normal.      Nose: Nose normal.      Mouth/Throat:      Pharynx: Oropharynx is clear.   Eyes:      Extraocular Movements: Extraocular movements intact.      Conjunctiva/sclera: Conjunctivae normal.      Pupils: Pupils are equal, round, and reactive to light.   Cardiovascular:      Rate and Rhythm: Normal rate and regular rhythm.      Pulses: " Normal pulses.      Heart sounds: Normal heart sounds.   Pulmonary:      Effort: Pulmonary effort is normal.      Breath sounds: Normal breath sounds.   Abdominal:      General: Abdomen is flat. Bowel sounds are normal.      Palpations: Abdomen is soft.   Musculoskeletal:      Cervical back: Normal range of motion and neck supple.      Comments: Scoliosis   Skin:     General: Skin is warm and dry.   Neurological:      General: No focal deficit present.      Mental Status: He is alert and oriented to person, place, and time.      Deep Tendon Reflexes: Reflexes abnormal.      Comments: Reflexes hyper   Psychiatric:         Mood and Affect: Mood normal.         Assessment/Plan   Problem List Items Addressed This Visit    None  Visit Diagnoses       Weakness of right lower extremity    -  Primary    Numbness of arm        Relevant Orders    MR cervical spine w and wo IV contrast    MR lumbar spine w and wo IV contrast    Right arm weakness        Relevant Orders    MR cervical spine w and wo IV contrast    MR lumbar spine w and wo IV contrast    Leg numbness        Relevant Orders    MR cervical spine w and wo IV contrast    MR lumbar spine w and wo IV contrast            Past recap   physical normal  Blood work ordered  Tdap not available follow-up to get it  Scheduled for an eye examination next week  Had colonoscopy at 49  Discussed diet exercise and healthy lifestyle     7/17/2023  Physical normal   EKG done shows right bundle branch block  Discussed with the patient that he needs to rule out sleep apnea needs rule out lung issues  We will do CT cardiac scoring  We will do CT lung cancer screening as he had lung nodules in the past  Check 2D echo  Routine blood work ordered  Encouraged to quit smoking  Follow-up after the test    8/26/2023  2D echo shows moderate pulmonary regurg slightly reduced ejection fraction  That could explain right bundle branch block  Advised patient to go for sleep study  CAT scan shows  emphysema and upper lung fields congratulated for quitting smoking  We will do nuclear pharmacological stress test  Repeat blood pressure 120/72  Will refer to cardiology for further evaluation    11/18/2023  Blood work reviewed  Stress test normal  Mild obstructive sleep apnea  Vitamin D low at 17  Vitamin D supplement started  Mild upper lobe emphysema  Patient has quit smoking,  Follow-up for routine physical next year    7/20/2024  Physical normal  Routine blood work ordered CBC be fasting with TSH vitamin D was low before  Check PSA  Patient's symptoms of right arm numbness could be related to scoliosis  Will do EMG right arm and right leg to evaluate further  Follow-up after blood work and EMG    7/27/2024  Blood work reviewed  All blood work in normal range  No electrolyte deficiency to explain numbness or tingling in the arms and legs  Vitamin D deficiency  Vitamin D 50,000 q. weekly for 3 months then 2000 a day  X-ray C-spine thoracic spine lumbar spine  EMG  Follow-up after the test    8/24/2024  C-spine and lumbosacral spine shows advanced discogenic degenerative changes  Patient's neurological examination is worse than last time  Will get MRI of C-spine and lumbosacral spine in view of abnormal x-rays  Hyperreflexic reflexes  EMG also shows possibility of radiculopathy in the cervical area and lumbar area  Follow-up after the MRI

## 2024-09-10 ENCOUNTER — APPOINTMENT (OUTPATIENT)
Dept: RADIOLOGY | Facility: CLINIC | Age: 59
End: 2024-09-10
Payer: COMMERCIAL

## 2024-09-10 ENCOUNTER — HOSPITAL ENCOUNTER (OUTPATIENT)
Dept: RADIOLOGY | Facility: CLINIC | Age: 59
Discharge: HOME | End: 2024-09-10
Payer: COMMERCIAL

## 2024-09-10 DIAGNOSIS — R20.0 LEG NUMBNESS: ICD-10-CM

## 2024-09-10 DIAGNOSIS — R29.898 RIGHT ARM WEAKNESS: ICD-10-CM

## 2024-09-10 DIAGNOSIS — R20.0 NUMBNESS OF ARM: ICD-10-CM

## 2024-09-10 PROCEDURE — 72141 MRI NECK SPINE W/O DYE: CPT | Performed by: RADIOLOGY

## 2024-09-10 PROCEDURE — 72141 MRI NECK SPINE W/O DYE: CPT

## 2024-09-16 ENCOUNTER — TELEPHONE (OUTPATIENT)
Dept: PRIMARY CARE | Facility: CLINIC | Age: 59
End: 2024-09-16
Payer: COMMERCIAL

## 2024-09-21 ENCOUNTER — HOSPITAL ENCOUNTER (OUTPATIENT)
Dept: RADIOLOGY | Facility: HOSPITAL | Age: 59
Discharge: HOME | End: 2024-09-21
Payer: COMMERCIAL

## 2024-09-21 DIAGNOSIS — R20.0 NUMBNESS OF ARM: ICD-10-CM

## 2024-09-21 DIAGNOSIS — R29.898 RIGHT ARM WEAKNESS: ICD-10-CM

## 2024-09-21 DIAGNOSIS — R20.0 LEG NUMBNESS: ICD-10-CM

## 2024-09-21 PROCEDURE — 72148 MRI LUMBAR SPINE W/O DYE: CPT | Performed by: RADIOLOGY

## 2024-09-21 PROCEDURE — 72148 MRI LUMBAR SPINE W/O DYE: CPT

## 2024-09-24 ENCOUNTER — TELEPHONE (OUTPATIENT)
Dept: PRIMARY CARE | Facility: CLINIC | Age: 59
End: 2024-09-24
Payer: COMMERCIAL

## 2024-09-26 ENCOUNTER — APPOINTMENT (OUTPATIENT)
Dept: RADIOLOGY | Facility: HOSPITAL | Age: 59
DRG: 519 | End: 2024-09-26
Payer: COMMERCIAL

## 2024-09-26 ENCOUNTER — HOSPITAL ENCOUNTER (INPATIENT)
Facility: HOSPITAL | Age: 59
DRG: 519 | End: 2024-09-26
Attending: EMERGENCY MEDICINE | Admitting: INTERNAL MEDICINE
Payer: COMMERCIAL

## 2024-09-26 ENCOUNTER — OFFICE VISIT (OUTPATIENT)
Dept: PRIMARY CARE | Facility: CLINIC | Age: 59
End: 2024-09-26
Payer: COMMERCIAL

## 2024-09-26 VITALS
WEIGHT: 183 LBS | SYSTOLIC BLOOD PRESSURE: 126 MMHG | DIASTOLIC BLOOD PRESSURE: 68 MMHG | BODY MASS INDEX: 26.2 KG/M2 | HEIGHT: 70 IN

## 2024-09-26 DIAGNOSIS — I45.2 RIGHT BUNDLE BRANCH BLOCK (RBBB) WITH LEFT ANTERIOR FASCICULAR BLOCK (LAFB): ICD-10-CM

## 2024-09-26 DIAGNOSIS — G95.9 CERVICAL MYELOPATHY: ICD-10-CM

## 2024-09-26 DIAGNOSIS — Z01.810 ENCOUNTER FOR PREPROCEDURAL CARDIOVASCULAR EXAMINATION: ICD-10-CM

## 2024-09-26 DIAGNOSIS — I10 PRIMARY HYPERTENSION: ICD-10-CM

## 2024-09-26 DIAGNOSIS — M50.020 CERVICAL DISC DISORDER WITH MYELOPATHY OF MID-CERVICAL REGION: Primary | ICD-10-CM

## 2024-09-26 DIAGNOSIS — G95.20 CERVICAL SPINAL CORD COMPRESSION: ICD-10-CM

## 2024-09-26 LAB
ALBUMIN SERPL BCP-MCNC: 4.5 G/DL (ref 3.4–5)
ALP SERPL-CCNC: 67 U/L (ref 33–136)
ALT SERPL W P-5'-P-CCNC: 19 U/L (ref 10–52)
ANION GAP SERPL CALC-SCNC: 14 MMOL/L (ref 10–20)
AST SERPL W P-5'-P-CCNC: 15 U/L (ref 9–39)
BASOPHILS # BLD AUTO: 0.03 X10*3/UL (ref 0–0.1)
BASOPHILS NFR BLD AUTO: 0.3 %
BILIRUB DIRECT SERPL-MCNC: 0.2 MG/DL (ref 0–0.3)
BILIRUB SERPL-MCNC: 0.6 MG/DL (ref 0–1.2)
BUN SERPL-MCNC: 10 MG/DL (ref 6–23)
CALCIUM SERPL-MCNC: 9 MG/DL (ref 8.6–10.3)
CHLORIDE SERPL-SCNC: 104 MMOL/L (ref 98–107)
CO2 SERPL-SCNC: 25 MMOL/L (ref 21–32)
CREAT SERPL-MCNC: 0.84 MG/DL (ref 0.5–1.3)
EGFRCR SERPLBLD CKD-EPI 2021: >90 ML/MIN/1.73M*2
EOSINOPHIL # BLD AUTO: 0.14 X10*3/UL (ref 0–0.7)
EOSINOPHIL NFR BLD AUTO: 1.3 %
ERYTHROCYTE [DISTWIDTH] IN BLOOD BY AUTOMATED COUNT: 12.2 % (ref 11.5–14.5)
GLUCOSE SERPL-MCNC: 90 MG/DL (ref 74–99)
HCT VFR BLD AUTO: 44.6 % (ref 41–52)
HGB BLD-MCNC: 15 G/DL (ref 13.5–17.5)
IMM GRANULOCYTES # BLD AUTO: 0.05 X10*3/UL (ref 0–0.7)
IMM GRANULOCYTES NFR BLD AUTO: 0.5 % (ref 0–0.9)
INR PPP: 1.1 (ref 0.9–1.1)
LYMPHOCYTES # BLD AUTO: 3.33 X10*3/UL (ref 1.2–4.8)
LYMPHOCYTES NFR BLD AUTO: 32 %
MCH RBC QN AUTO: 28.1 PG (ref 26–34)
MCHC RBC AUTO-ENTMCNC: 33.6 G/DL (ref 32–36)
MCV RBC AUTO: 84 FL (ref 80–100)
MONOCYTES # BLD AUTO: 0.61 X10*3/UL (ref 0.1–1)
MONOCYTES NFR BLD AUTO: 5.9 %
NEUTROPHILS # BLD AUTO: 6.24 X10*3/UL (ref 1.2–7.7)
NEUTROPHILS NFR BLD AUTO: 60 %
NRBC BLD-RTO: 0 /100 WBCS (ref 0–0)
PLATELET # BLD AUTO: 259 X10*3/UL (ref 150–450)
POTASSIUM SERPL-SCNC: 3.9 MMOL/L (ref 3.5–5.3)
PROT SERPL-MCNC: 7.5 G/DL (ref 6.4–8.2)
PROTHROMBIN TIME: 12.6 SECONDS (ref 9.8–12.8)
RBC # BLD AUTO: 5.34 X10*6/UL (ref 4.5–5.9)
SODIUM SERPL-SCNC: 139 MMOL/L (ref 136–145)
WBC # BLD AUTO: 10.4 X10*3/UL (ref 4.4–11.3)

## 2024-09-26 PROCEDURE — 72125 CT NECK SPINE W/O DYE: CPT | Performed by: RADIOLOGY

## 2024-09-26 PROCEDURE — 1100000001 HC PRIVATE ROOM DAILY

## 2024-09-26 PROCEDURE — 99214 OFFICE O/P EST MOD 30 MIN: CPT | Performed by: INTERNAL MEDICINE

## 2024-09-26 PROCEDURE — 72040 X-RAY EXAM NECK SPINE 2-3 VW: CPT

## 2024-09-26 PROCEDURE — 72125 CT NECK SPINE W/O DYE: CPT

## 2024-09-26 PROCEDURE — 80048 BASIC METABOLIC PNL TOTAL CA: CPT | Performed by: EMERGENCY MEDICINE

## 2024-09-26 PROCEDURE — 3078F DIAST BP <80 MM HG: CPT | Performed by: INTERNAL MEDICINE

## 2024-09-26 PROCEDURE — 85610 PROTHROMBIN TIME: CPT | Performed by: EMERGENCY MEDICINE

## 2024-09-26 PROCEDURE — 99285 EMERGENCY DEPT VISIT HI MDM: CPT

## 2024-09-26 PROCEDURE — 3074F SYST BP LT 130 MM HG: CPT | Performed by: INTERNAL MEDICINE

## 2024-09-26 PROCEDURE — 36415 COLL VENOUS BLD VENIPUNCTURE: CPT | Performed by: EMERGENCY MEDICINE

## 2024-09-26 PROCEDURE — 3008F BODY MASS INDEX DOCD: CPT | Performed by: INTERNAL MEDICINE

## 2024-09-26 PROCEDURE — 85025 COMPLETE CBC W/AUTO DIFF WBC: CPT | Performed by: EMERGENCY MEDICINE

## 2024-09-26 PROCEDURE — 2500000004 HC RX 250 GENERAL PHARMACY W/ HCPCS (ALT 636 FOR OP/ED): Performed by: INTERNAL MEDICINE

## 2024-09-26 PROCEDURE — 82248 BILIRUBIN DIRECT: CPT | Performed by: EMERGENCY MEDICINE

## 2024-09-26 RX ORDER — GUAIFENESIN 600 MG/1
600 TABLET, EXTENDED RELEASE ORAL EVERY 12 HOURS PRN
Status: DISCONTINUED | OUTPATIENT
Start: 2024-09-26 | End: 2024-10-01 | Stop reason: HOSPADM

## 2024-09-26 RX ORDER — ACETAMINOPHEN 325 MG/1
650 TABLET ORAL EVERY 4 HOURS PRN
Status: DISCONTINUED | OUTPATIENT
Start: 2024-09-26 | End: 2024-09-30

## 2024-09-26 RX ORDER — ACETAMINOPHEN 650 MG/1
650 SUPPOSITORY RECTAL EVERY 4 HOURS PRN
Status: DISCONTINUED | OUTPATIENT
Start: 2024-09-26 | End: 2024-09-30

## 2024-09-26 RX ORDER — POLYETHYLENE GLYCOL 3350 17 G/17G
17 POWDER, FOR SOLUTION ORAL DAILY
Status: DISCONTINUED | OUTPATIENT
Start: 2024-09-26 | End: 2024-10-01 | Stop reason: HOSPADM

## 2024-09-26 RX ORDER — TALC
3 POWDER (GRAM) TOPICAL NIGHTLY PRN
Status: DISCONTINUED | OUTPATIENT
Start: 2024-09-26 | End: 2024-10-01 | Stop reason: HOSPADM

## 2024-09-26 RX ORDER — ACETAMINOPHEN 160 MG/5ML
650 SOLUTION ORAL EVERY 4 HOURS PRN
Status: DISCONTINUED | OUTPATIENT
Start: 2024-09-26 | End: 2024-09-30

## 2024-09-26 RX ORDER — PANTOPRAZOLE SODIUM 40 MG/1
40 TABLET, DELAYED RELEASE ORAL
Status: DISCONTINUED | OUTPATIENT
Start: 2024-09-27 | End: 2024-10-01 | Stop reason: HOSPADM

## 2024-09-26 RX ORDER — ENOXAPARIN SODIUM 100 MG/ML
40 INJECTION SUBCUTANEOUS EVERY 24 HOURS
Status: DISCONTINUED | OUTPATIENT
Start: 2024-09-26 | End: 2024-09-30

## 2024-09-26 RX ORDER — DEXTROSE MONOHYDRATE, SODIUM CHLORIDE, AND POTASSIUM CHLORIDE 50; 1.49; 4.5 G/1000ML; G/1000ML; G/1000ML
100 INJECTION, SOLUTION INTRAVENOUS CONTINUOUS
Status: DISCONTINUED | OUTPATIENT
Start: 2024-09-26 | End: 2024-10-01 | Stop reason: HOSPADM

## 2024-09-26 RX ORDER — ONDANSETRON HYDROCHLORIDE 2 MG/ML
4 INJECTION, SOLUTION INTRAVENOUS EVERY 8 HOURS PRN
Status: DISCONTINUED | OUTPATIENT
Start: 2024-09-26 | End: 2024-10-01 | Stop reason: HOSPADM

## 2024-09-26 RX ORDER — NAPROXEN SODIUM 220 MG
220 TABLET ORAL EVERY 12 HOURS PRN
COMMUNITY
End: 2024-10-01 | Stop reason: HOSPADM

## 2024-09-26 RX ORDER — ASPIRIN 325 MG
325 TABLET ORAL DAILY PRN
COMMUNITY

## 2024-09-26 RX ORDER — DOCUSATE SODIUM 100 MG/1
100 CAPSULE, LIQUID FILLED ORAL 2 TIMES DAILY
Status: DISCONTINUED | OUTPATIENT
Start: 2024-09-26 | End: 2024-10-01 | Stop reason: HOSPADM

## 2024-09-26 RX ORDER — GUAIFENESIN/DEXTROMETHORPHAN 100-10MG/5
5 SYRUP ORAL EVERY 4 HOURS PRN
Status: DISCONTINUED | OUTPATIENT
Start: 2024-09-26 | End: 2024-10-01 | Stop reason: HOSPADM

## 2024-09-26 RX ORDER — ONDANSETRON 4 MG/1
4 TABLET, FILM COATED ORAL EVERY 8 HOURS PRN
Status: DISCONTINUED | OUTPATIENT
Start: 2024-09-26 | End: 2024-10-01 | Stop reason: HOSPADM

## 2024-09-26 RX ORDER — PANTOPRAZOLE SODIUM 40 MG/10ML
40 INJECTION, POWDER, LYOPHILIZED, FOR SOLUTION INTRAVENOUS
Status: DISCONTINUED | OUTPATIENT
Start: 2024-09-27 | End: 2024-10-01 | Stop reason: HOSPADM

## 2024-09-26 SDOH — ECONOMIC STABILITY: TRANSPORTATION INSECURITY
IN THE PAST 12 MONTHS, HAS THE LACK OF TRANSPORTATION KEPT YOU FROM MEDICAL APPOINTMENTS OR FROM GETTING MEDICATIONS?: NO

## 2024-09-26 SDOH — SOCIAL STABILITY: SOCIAL INSECURITY
WITHIN THE LAST YEAR, HAVE YOU BEEN KICKED, HIT, SLAPPED, OR OTHERWISE PHYSICALLY HURT BY YOUR PARTNER OR EX-PARTNER?: NO

## 2024-09-26 SDOH — SOCIAL STABILITY: SOCIAL INSECURITY: ARE YOU OR HAVE YOU BEEN THREATENED OR ABUSED PHYSICALLY, EMOTIONALLY, OR SEXUALLY BY ANYONE?: NO

## 2024-09-26 SDOH — SOCIAL STABILITY: SOCIAL INSECURITY: HAVE YOU HAD ANY THOUGHTS OF HARMING ANYONE ELSE?: NO

## 2024-09-26 SDOH — SOCIAL STABILITY: SOCIAL INSECURITY: HAS ANYONE EVER THREATENED TO HURT YOUR FAMILY OR YOUR PETS?: NO

## 2024-09-26 SDOH — ECONOMIC STABILITY: FOOD INSECURITY: WITHIN THE PAST 12 MONTHS, YOU WORRIED THAT YOUR FOOD WOULD RUN OUT BEFORE YOU GOT MONEY TO BUY MORE.: NEVER TRUE

## 2024-09-26 SDOH — SOCIAL STABILITY: SOCIAL INSECURITY: DO YOU FEEL ANYONE HAS EXPLOITED OR TAKEN ADVANTAGE OF YOU FINANCIALLY OR OF YOUR PERSONAL PROPERTY?: NO

## 2024-09-26 SDOH — ECONOMIC STABILITY: INCOME INSECURITY: IN THE LAST 12 MONTHS, WAS THERE A TIME WHEN YOU WERE NOT ABLE TO PAY THE MORTGAGE OR RENT ON TIME?: NO

## 2024-09-26 SDOH — SOCIAL STABILITY: SOCIAL INSECURITY
WITHIN THE LAST YEAR, HAVE TO BEEN RAPED OR FORCED TO HAVE ANY KIND OF SEXUAL ACTIVITY BY YOUR PARTNER OR EX-PARTNER?: NO

## 2024-09-26 SDOH — ECONOMIC STABILITY: HOUSING INSECURITY: AT ANY TIME IN THE PAST 12 MONTHS, WERE YOU HOMELESS OR LIVING IN A SHELTER (INCLUDING NOW)?: NO

## 2024-09-26 SDOH — SOCIAL STABILITY: SOCIAL INSECURITY: WITHIN THE LAST YEAR, HAVE YOU BEEN HUMILIATED OR EMOTIONALLY ABUSED IN OTHER WAYS BY YOUR PARTNER OR EX-PARTNER?: NO

## 2024-09-26 SDOH — ECONOMIC STABILITY: INCOME INSECURITY: IN THE PAST 12 MONTHS, HAS THE ELECTRIC, GAS, OIL, OR WATER COMPANY THREATENED TO SHUT OFF SERVICE IN YOUR HOME?: NO

## 2024-09-26 SDOH — SOCIAL STABILITY: SOCIAL INSECURITY: HAVE YOU HAD THOUGHTS OF HARMING ANYONE ELSE?: NO

## 2024-09-26 SDOH — SOCIAL STABILITY: SOCIAL INSECURITY: ABUSE: ADULT

## 2024-09-26 SDOH — SOCIAL STABILITY: SOCIAL INSECURITY: WITHIN THE LAST YEAR, HAVE YOU BEEN AFRAID OF YOUR PARTNER OR EX-PARTNER?: NO

## 2024-09-26 SDOH — SOCIAL STABILITY: SOCIAL INSECURITY: ARE THERE ANY APPARENT SIGNS OF INJURIES/BEHAVIORS THAT COULD BE RELATED TO ABUSE/NEGLECT?: NO

## 2024-09-26 SDOH — ECONOMIC STABILITY: FOOD INSECURITY: WITHIN THE PAST 12 MONTHS, THE FOOD YOU BOUGHT JUST DIDN'T LAST AND YOU DIDN'T HAVE MONEY TO GET MORE.: NEVER TRUE

## 2024-09-26 SDOH — ECONOMIC STABILITY: TRANSPORTATION INSECURITY
IN THE PAST 12 MONTHS, HAS LACK OF TRANSPORTATION KEPT YOU FROM MEETINGS, WORK, OR FROM GETTING THINGS NEEDED FOR DAILY LIVING?: NO

## 2024-09-26 SDOH — SOCIAL STABILITY: SOCIAL INSECURITY: DOES ANYONE TRY TO KEEP YOU FROM HAVING/CONTACTING OTHER FRIENDS OR DOING THINGS OUTSIDE YOUR HOME?: NO

## 2024-09-26 SDOH — ECONOMIC STABILITY: INCOME INSECURITY: HOW HARD IS IT FOR YOU TO PAY FOR THE VERY BASICS LIKE FOOD, HOUSING, MEDICAL CARE, AND HEATING?: NOT HARD AT ALL

## 2024-09-26 SDOH — ECONOMIC STABILITY: HOUSING INSECURITY: IN THE PAST 12 MONTHS, HOW MANY TIMES HAVE YOU MOVED WHERE YOU WERE LIVING?: 1

## 2024-09-26 SDOH — SOCIAL STABILITY: SOCIAL INSECURITY: DO YOU FEEL UNSAFE GOING BACK TO THE PLACE WHERE YOU ARE LIVING?: NO

## 2024-09-26 SDOH — SOCIAL STABILITY: SOCIAL INSECURITY: WERE YOU ABLE TO COMPLETE ALL THE BEHAVIORAL HEALTH SCREENINGS?: YES

## 2024-09-26 ASSESSMENT — COGNITIVE AND FUNCTIONAL STATUS - GENERAL
PATIENT BASELINE BEDBOUND: NO
MOBILITY SCORE: 21
DAILY ACTIVITIY SCORE: 24
STANDING UP FROM CHAIR USING ARMS: A LITTLE
WALKING IN HOSPITAL ROOM: A LITTLE
CLIMB 3 TO 5 STEPS WITH RAILING: A LITTLE

## 2024-09-26 ASSESSMENT — LIFESTYLE VARIABLES
HOW OFTEN DO YOU HAVE 6 OR MORE DRINKS ON ONE OCCASION: NEVER
HOW OFTEN DO YOU HAVE A DRINK CONTAINING ALCOHOL: NEVER
HOW MANY STANDARD DRINKS CONTAINING ALCOHOL DO YOU HAVE ON A TYPICAL DAY: PATIENT DOES NOT DRINK
AUDIT-C TOTAL SCORE: 0
SKIP TO QUESTIONS 9-10: 1
AUDIT-C TOTAL SCORE: 0

## 2024-09-26 ASSESSMENT — COLUMBIA-SUICIDE SEVERITY RATING SCALE - C-SSRS
2. HAVE YOU ACTUALLY HAD ANY THOUGHTS OF KILLING YOURSELF?: NO
6. HAVE YOU EVER DONE ANYTHING, STARTED TO DO ANYTHING, OR PREPARED TO DO ANYTHING TO END YOUR LIFE?: NO
1. IN THE PAST MONTH, HAVE YOU WISHED YOU WERE DEAD OR WISHED YOU COULD GO TO SLEEP AND NOT WAKE UP?: NO

## 2024-09-26 ASSESSMENT — PAIN DESCRIPTION - PAIN TYPE: TYPE: CHRONIC PAIN

## 2024-09-26 ASSESSMENT — PAIN DESCRIPTION - FREQUENCY: FREQUENCY: INTERMITTENT

## 2024-09-26 ASSESSMENT — ACTIVITIES OF DAILY LIVING (ADL)
JUDGMENT_ADEQUATE_SAFELY_COMPLETE_DAILY_ACTIVITIES: YES
DRESSING YOURSELF: INDEPENDENT
HEARING - RIGHT EAR: FUNCTIONAL
WALKS IN HOME: INDEPENDENT
ADEQUATE_TO_COMPLETE_ADL: YES
GROOMING: INDEPENDENT
FEEDING YOURSELF: INDEPENDENT
BATHING: INDEPENDENT
HEARING - LEFT EAR: FUNCTIONAL
PATIENT'S MEMORY ADEQUATE TO SAFELY COMPLETE DAILY ACTIVITIES?: YES
LACK_OF_TRANSPORTATION: NO
TOILETING: INDEPENDENT

## 2024-09-26 ASSESSMENT — PATIENT HEALTH QUESTIONNAIRE - PHQ9
2. FEELING DOWN, DEPRESSED OR HOPELESS: NOT AT ALL
1. LITTLE INTEREST OR PLEASURE IN DOING THINGS: NOT AT ALL
SUM OF ALL RESPONSES TO PHQ9 QUESTIONS 1 & 2: 0

## 2024-09-26 ASSESSMENT — PAIN SCALES - GENERAL
PAINLEVEL_OUTOF10: 2
PAINLEVEL_OUTOF10: 2

## 2024-09-26 ASSESSMENT — PAIN - FUNCTIONAL ASSESSMENT: PAIN_FUNCTIONAL_ASSESSMENT: 0-10

## 2024-09-26 ASSESSMENT — PAIN DESCRIPTION - LOCATION: LOCATION: BACK

## 2024-09-26 NOTE — CONSULTS
Consults    Reason For Consult  C3-4 severe stenosis    History Of Present Illness  Junior Fontanez is a 59 y.o. male presenting with h/o bundle branch block mitral valve regurgitation, p/w neck pain, LUE ext weakness, 9/10 MRI CS w/ severe C3-4 stenosis and cord signal change, 9/23 MRI LS chronic degen. Patient was sent in today by PCP for MRI findings. He states that over past few months he has had LUE arm weakness, progressive balance issues, trouble using his hands. Endorses dropping objects, having trouble walking. Denies AC/AP. Denies bowel and bladder symptoms.     Past Medical History  He has no past medical history on file.    Surgical History  He has a past surgical history that includes Tonsillectomy (11/17/2016).     Social History  He reports that he has quit smoking. His smoking use included cigarettes. He has never used smokeless tobacco. He reports that he does not drink alcohol and does not use drugs.    Family History  No family history on file.     Allergies  Patient has no known allergies.    Review of Systems   All other systems reviewed and are negative.       Physical Exam  HENT:      Head: Normocephalic.      Nose: Nose normal.   Eyes:      Pupils: Pupils are equal, round, and reactive to light.   Pulmonary:      Effort: Pulmonary effort is normal.   Musculoskeletal:         General: Normal range of motion.      Cervical back: Normal range of motion.   Neurological:      Mental Status: He is alert.      Sensory: Sensory deficit: .imgrslt.      Comments: Strength   Right deltoid: 5/5  Left deltoid: 5/5  Right biceps: 5/5  Left biceps: 5/5  Right triceps: 5/5  Left triceps: 5/5  Right interossei: 4/5  Left interossei: 4/5  Right quadriceps: 5/5  Left quadriceps: 5/5  Right anterior tibial: 5/5  Left anterior tibial: 5/5  Right gastroc: 5/5  Left gastroc: 5/5    R aguilar  BLE clonus, hypperreflexia          Last Recorded Vitals  Blood pressure (!) 160/97, pulse 84, temperature 36.4 °C (97.6  "°F), temperature source Tympanic, resp. rate 20, height 1.778 m (5' 10\"), weight 83.9 kg (185 lb), SpO2 94%.    Relevant Results  CT cervical spine wo IV contrast    Result Date: 9/26/2024  Interpreted By:  Junior Garcia, STUDY: CT CERVICAL SPINE WO IV CONTRAST; 9/26/2024 3:19 pm   INDICATION: CLINICAL DATA: Signs/Symptoms:cervical stenosis.   COMPARISON: None.   ACCESSION NUMBER(S): [id number]   ORDERING CLINICIAN: SARAH FARRIS   TECHNIQUE: A helical acquisition of data was obtained with multiplanar reconstructions performed.   One or more of the following dose reduction techniques were used: Automated exposure control Adjustment of the mA and/or kV according to patient size, and/or use of iterative reconstruction technique.   FINDINGS: No fractures or destructive lesions are identified. There is a grade 1, 2-3 mm retrolisthesis of C3 relative to C4. There is disc space narrowing with anterior and posterior marginal spurring from C3 through C7, most marked at C3-4 where there is a nearly bone-on-bone appearance at the C3 and C4 vertebral bodies with associated marrow space sclerosis and marginal spurring.   There is an 8 mm nodule midpole right lobe of the thyroid gland. Thyroid ultrasound correlation is suggested on a nonemergent basis. *C2-3: There is mild uncovertebral spur encroachment on the right C2-3 foramen. Left C2-3 foramen is capacious. *C3-4: There is spinal canal stenosis at C3-4 secondary to posterior marginal osteophyte encroachment upon the canal measuring on the order of 3-4 mm in AP diameter with the encroachment more marked extending into the right lateral recess. There is severe foraminal stenosis bilaterally at C3-4 from uncovertebral spurring. *C4-5: There is mild broad-based effacement of the anterior aspect of the spinal canal at C4-5 from small osteophyte disc complex. There is foraminal stenosis bilaterally at C4-5, more marked on the left secondary to uncovertebral spur encroachment " along with facet spurring encroaches upon the left at C4-5. *C5-6: Spinal canal is capacious at C5-6. There is mild foraminal stenosis on the left at C5-6 from uncovertebral spurring. Right C5-6 foramen is capacious. *C6-7: Spinal canal is capacious at this level. Right C6-7 foramen is capacious. There is mild uncovertebral spur encroaching upon the left C6-7 foramen.       1. No evidence for a fracture on this exam. 2. Cervical spondylosis as detailed above with particular note made of spinal canal stenosis at C3-4. Multilevel foraminal stenosis is present. Please see the individual disc level reports above. 3. 8 mm midpole nodule right lobe of the thyroid gland for which follow-up nonemergency thyroid ultrasound correlation is suggested.     MACRO: none   Signed by: Junior Garcia 9/26/2024 4:01 PM Dictation workstation:   [id number]    MR lumbar spine wo IV contrast    Result Date: 9/23/2024  Interpreted By:  Lauro Whiteside, STUDY: MR LUMBAR SPINE WO IV CONTRAST; ;  9/21/2024 1:35 pm   INDICATION: Signs/Symptoms:arm numbness/weakness.   ,R20.0 Anesthesia of skin,R29.898 Other symptoms and signs involving the musculoskeletal system,R20.0 Anesthesia of skin   COMPARISON: Plain films of the lumbosacral spine from 08/03/2024.   ACCESSION NUMBER(S): [id number]   ORDERING CLINICIAN: SHERRI BALDERAS   TECHNIQUE: MRI of the lumbar spine was performed with the acquisition of sagittal T2, sagittal T1, sagittal STIR, axial T1, and axial T2 weighted sequences.   FINDINGS: This report assumes 5 non-rib bearing lumbar vertebral bodies. The lowest intervertebral disc will be labeled L5-S1.   There is straightening of the lumbar spine. There is no striking spondylolisthesis. Vertebral body heights are maintained. There is mild intervertebral disc height narrowing at L1-L2 and L3-L4 and moderate intervertebral disc height narrowing at L4-L5 and L5-S1. There are chronic degenerative endplate changes at multiple levels including  osteophytic spurring. There is mild STIR hyperintense signal located within some of the endplates which most consistent degenerative osseous edema versus reactive hypervascularity. Marrow signal is overall within normal limits.   The conus medullaris terminates at the level of L1-L2 and is unremarkable in appearance.   At T12-L1, there is no posterior disc contour abnormality. There is no spinal canal stenosis or neural foraminal narrowing. There is no facet osteoarthropathy.   At L1-L2, there is a small diffuse disc bulge with osteophytic spurring which causes mild-to-moderate spinal canal stenosis. There is extension of disc and osteophyte into the right neural foramen and there is resulting mild right neural foraminal narrowing. There is no narrowing of the left neural foramen. There is no facet osteoarthropathy.   At L2-L3, there is a small diffuse disc bulge. There is no spinal canal stenosis or neural foraminal narrowing. There is no facet osteoarthropathy.   At L3-L4, there is a diffuse disc bulge with osteophytic spurring which causes mild spinal canal stenosis. There is extension of osteophyte into the right neural foramen. There is also extension of the disc into the right neural foramen with superior migration and the disc abuts and may compress the exiting right L3 nerve root (series 4, image 16 of 23) there is overall moderate-to-marked right neural foraminal narrowing. There is extension of disc and osteophyte into the left neural foramen resulting in mild neural foraminal narrowing. There is no striking facet osteoarthropathy.   At L4-L5, there is a diffuse disc bulge, slightly eccentric to the right. There is osteophytic spurring. There is no striking spinal canal stenosis. There is extension of disc and osteophyte into the bilateral neural foramina there is resulting moderate right and mild left neural foraminal narrowing. There is no striking facet osteoarthropathy.   At L5-S1, there is a diffuse  disc bulge with osteophytic spurring which narrows the left-greater-than-right lateral recesses. Disc is in close proximity to the traversing left S1 nerve root. There is no spinal canal stenosis. There is extension of disc and osteophyte into the bilateral neural foramina and there is resulting moderate bilateral neural foraminal narrowing. There is mild left facet osteoarthropathy.   There is a 1.4 cm cyst in the right kidney.       1. At L3-L4, there is a right foraminal disc protrusion with superior migration which is in close proximity to the exiting right L3 nerve root and may abut it.   2. Additional multilevel degenerative changes of the lumbar spine including mild-to-moderate spinal canal stenosis at L1-L2.   This study was interpreted at [facility].   MACRO: Non Critical Finding:  See findings. Notification was initiated on 9/23/2024 at 12:35 pm by  Lauro Whiteside.  (**-YCF-**)e   Signed by: Lauro Whiteside 9/23/2024 12:36 PM Dictation workstation:   [id number]    MR cervical spine wo IV contrast    Result Date: 9/11/2024  Interpreted By:  Sidney Palacio and Ebai Jerky STUDY: MR CERVICAL SPINE WO IV CONTRAST;  9/10/2024 9:21 am   INDICATION: Signs/Symptoms:arm numbness/weakness.   ,R20.0 Anesthesia of skin,R29.898 Other symptoms and signs involving the musculoskeletal system,R20.0 Anesthesia of skin   COMPARISON: None.   ACCESSION NUMBER(S): [id number]   ORDERING CLINICIAN: SHERRI BALDERAS   TECHNIQUE: Sagittal T1, T2, STIR, axial T1 and axial T2 weighted images were acquired through the cervical spine.   FINDINGS: Alignment: There is a 3 mm C3-C4 retrolisthesis.   Vertebrae/Intervertebral Discs: The vertebral bodies demonstrate expected height. No evidence of an acute fracture. There is no marrow replacing lesion. Multilevel disc desiccation and disc height loss with endplate degenerative changes and osteophytic spurring. There is severe C3-C4 disc height loss with abnormal  STIR hyperintense signal within the endplate/vertebral bodies, likely relating to Modic type 1 endplate degenerative change.   Cord: There is flattening of the spinal cord C3-C4 where there is a short segment of abnormal STIR hyperintense signal most compatible with myelomalacia. Remainder of the visualized cord is unremarkable.   C1-C2: The cervicomedullary junction appears unremarkable. There is no central canal stenosis.   C2-C3: Small central disc herniation mild effaces the ventral CSF space without significant spinal canal stenosis. Uncovertebral joint hypertrophy and facet osteoarthrosis contributes to moderate right and mild left neural foraminal stenosis.   C3-C4: Retrolisthesis and posterior disc osteophyte complex with complete effacement of the ventral and dorsal CSF space. There is flattening of the ventral and dorsal aspect of the spinal cord. Severe narrowing of the spinal canal. There is abnormal STIR hyperintense signal at this level within the spinal cord. Uncovertebral joint hypertrophy and facet osteoarthrosis contributes to severe bilateral neural foraminal stenosis.   C4-C5: Disc osteophyte complex mildly effaces the ventral CSF space without significant spinal canal stenosis. Uncovertebral joint hypertrophy and facet osteoarthrosis contributes to severe bilateral neural foraminal stenosis, left-greater-than-right.   C5-C6: Disc bulge mildly effaces the ventral CSF space without significant spinal canal stenosis. Uncovertebral joint hypertrophy and facet osteoarthrosis contributes to moderate to severe left and mild right neural foraminal stenosis.   C6-C7: Disc bulge effaces the ventral CSF space without significant spinal canal stenosis. Uncovertebral joint hypertrophy and facet osteoarthrosis contributes to moderate left and mild right neural foraminal stenosis   C7-T1: There is no posterior disc contour abnormality. There is no significant central canal or neural foraminal stenosis.   The  upper thoracic vertebrae and spinal canal are unremarkable.   Polypoidal mucosal thickening of the visualized sphenoid sinus.       Multilevel degenerative changes of the cervical spine most pronounced for severe C3-C4 spinal canal stenosis where there is a short segment of abnormal spinal cord signal, likely representing myelomalacia. Varying degrees mild to severe neural foraminal stenosis as described.   I personally reviewed the images/study and I agree with the findings as stated by Resident Deya Smart. This study was interpreted at [facility], [place], [place].   MACRO: Deya Smart discussed the significance and urgency of this critical finding by telephone with  SHERRI BALDERAS on 9/10/2024 at 3:19 pm. (**-RCF-**) Findings:  See findings.   Signed by: Sidney Palacio 9/11/2024 9:04 AM Dictation workstation:   [id number]        Assessment/Plan     h/o bundle branch block mitral valve regurgitation, p/w neck pain, LUE ext weakness, 9/10 MRI CS w/ severe C3-4 stenosis and cord signal change, 9/23 MRI LS chronic degen    ASSESSMENT  Patient with signs of progressive myelopathy on exam with severe C3-4 stenosis, cord signal change  Given his progression of symptoms, difficulty walking and using his hands, recommend surgical intervention to ARREST progression of symptoms  Recommend admission for medical optimization and surgery on Monday 9/30  Please document RCRI when able  Please obtain preoperative labwork (CBC, T&S, coag, RFP) and make patient NPO at midnight Sun 9/29  Please obtain cervical XR and CT for planning  OK for DVT ppx until midnight 9/29  Discussed above with patient who is agreement of plan    Staffed with Dr. Tracey

## 2024-09-26 NOTE — PROGRESS NOTES
Pharmacy Medication History Review    Per patient.     Junior Fontanez is a 59 y.o. male admitted for Cervical myelopathy. Pharmacy reviewed the patient's [id number] medications and allergies for accuracy.    The list below reflectives the updated PTA list. Please review each medication in order reconciliation for additional clarification and justification.       The list below reflectives the updated allergy list. Please review each documented allergy for additional clarification and justification.  Allergies  Reviewed by Luzmaria Garcia RN on 9/26/2024   No Known Allergies         Below are additional concerns with the patient's PTA list.  Prior to Admission Medications   Prescriptions Last Dose Informant   aspirin 325 mg tablet     Sig: Take 1 tablet (325 mg) by mouth once daily as needed for mild pain (1 - 3).   calcium carbonate EX (Tums Extra Strength) 300 mg (750 mg) chewable tablet 9/25/2024    Sig: Chew 1 tablet (750 mg) once daily as needed for indigestion or heartburn.   ergocalciferol (Vitamin D-2) 1.25 MG (65755 UT) capsule 9/23/2024    Sig: Take 1 capsule (50,000 Units) by mouth 1 (one) time per week.   Patient taking differently: Take 1 capsule (50,000 Units) by mouth 1 (one) time per week. Monday   naproxen sodium (Aleve) 220 mg tablet     Sig: Take 1 tablet (220 mg) by mouth every 12 hours if needed for mild pain (1 - 3).      Facility-Administered Medications: None        Sherri Ventura

## 2024-09-26 NOTE — ED TRIAGE NOTES
"Right arm weak and numb \"for months\", walks with limp x 6 months....  Sent in by Dr. Osborn PCP for neurosurgery evaluation after MRI Completed last week with narrowing and stenosis of spine with degenerative disks  "

## 2024-09-27 ENCOUNTER — APPOINTMENT (OUTPATIENT)
Dept: CARDIOLOGY | Facility: HOSPITAL | Age: 59
DRG: 519 | End: 2024-09-27
Payer: COMMERCIAL

## 2024-09-27 PROBLEM — I10 PRIMARY HYPERTENSION: Status: ACTIVE | Noted: 2024-09-27

## 2024-09-27 PROBLEM — F41.9 ANXIETY: Status: ACTIVE | Noted: 2024-09-27

## 2024-09-27 LAB
ALBUMIN SERPL BCP-MCNC: 4.2 G/DL (ref 3.4–5)
ALP SERPL-CCNC: 65 U/L (ref 33–136)
ALT SERPL W P-5'-P-CCNC: 18 U/L (ref 10–52)
ANION GAP SERPL CALC-SCNC: 15 MMOL/L (ref 10–20)
AORTIC VALVE MEAN GRADIENT: 2.7 MMHG
AORTIC VALVE PEAK VELOCITY: 1.09 M/S
AST SERPL W P-5'-P-CCNC: 14 U/L (ref 9–39)
AV PEAK GRADIENT: 4.8 MMHG
AVA (PEAK VEL): 3.49 CM2
AVA (VTI): 3.2 CM2
BILIRUB SERPL-MCNC: 0.9 MG/DL (ref 0–1.2)
BUN SERPL-MCNC: 9 MG/DL (ref 6–23)
CALCIUM SERPL-MCNC: 8.6 MG/DL (ref 8.6–10.3)
CHLORIDE SERPL-SCNC: 105 MMOL/L (ref 98–107)
CO2 SERPL-SCNC: 23 MMOL/L (ref 21–32)
CREAT SERPL-MCNC: 0.91 MG/DL (ref 0.5–1.3)
EGFRCR SERPLBLD CKD-EPI 2021: >90 ML/MIN/1.73M*2
EJECTION FRACTION APICAL 4 CHAMBER: 59.6
EJECTION FRACTION: 65 %
ERYTHROCYTE [DISTWIDTH] IN BLOOD BY AUTOMATED COUNT: 12.1 % (ref 11.5–14.5)
GLUCOSE SERPL-MCNC: 128 MG/DL (ref 74–99)
HCT VFR BLD AUTO: 44.1 % (ref 41–52)
HGB BLD-MCNC: 14.8 G/DL (ref 13.5–17.5)
LEFT ATRIUM VOLUME AREA LENGTH INDEX BSA: 16.8 ML/M2
LEFT VENTRICLE INTERNAL DIMENSION DIASTOLE: 4.6 CM (ref 3.5–6)
LEFT VENTRICULAR OUTFLOW TRACT DIAMETER: 2.18 CM
MCH RBC QN AUTO: 28.2 PG (ref 26–34)
MCHC RBC AUTO-ENTMCNC: 33.6 G/DL (ref 32–36)
MCV RBC AUTO: 84 FL (ref 80–100)
MITRAL VALVE E/A RATIO: 0.63
NRBC BLD-RTO: 0 /100 WBCS (ref 0–0)
PLATELET # BLD AUTO: 239 X10*3/UL (ref 150–450)
POTASSIUM SERPL-SCNC: 4.2 MMOL/L (ref 3.5–5.3)
PROT SERPL-MCNC: 7.1 G/DL (ref 6.4–8.2)
RBC # BLD AUTO: 5.24 X10*6/UL (ref 4.5–5.9)
RIGHT VENTRICLE PEAK SYSTOLIC PRESSURE: 21.7 MMHG
SODIUM SERPL-SCNC: 139 MMOL/L (ref 136–145)
TRICUSPID ANNULAR PLANE SYSTOLIC EXCURSION: 2.6 CM
WBC # BLD AUTO: 7.1 X10*3/UL (ref 4.4–11.3)

## 2024-09-27 PROCEDURE — 80053 COMPREHEN METABOLIC PANEL: CPT | Performed by: INTERNAL MEDICINE

## 2024-09-27 PROCEDURE — 93306 TTE W/DOPPLER COMPLETE: CPT

## 2024-09-27 PROCEDURE — 36415 COLL VENOUS BLD VENIPUNCTURE: CPT | Performed by: INTERNAL MEDICINE

## 2024-09-27 PROCEDURE — 99222 1ST HOSP IP/OBS MODERATE 55: CPT | Performed by: INTERNAL MEDICINE

## 2024-09-27 PROCEDURE — 85027 COMPLETE CBC AUTOMATED: CPT | Performed by: INTERNAL MEDICINE

## 2024-09-27 PROCEDURE — 2500000001 HC RX 250 WO HCPCS SELF ADMINISTERED DRUGS (ALT 637 FOR MEDICARE OP): Performed by: INTERNAL MEDICINE

## 2024-09-27 PROCEDURE — 1100000001 HC PRIVATE ROOM DAILY

## 2024-09-27 PROCEDURE — 99222 1ST HOSP IP/OBS MODERATE 55: CPT

## 2024-09-27 PROCEDURE — 2500000004 HC RX 250 GENERAL PHARMACY W/ HCPCS (ALT 636 FOR OP/ED): Performed by: INTERNAL MEDICINE

## 2024-09-27 PROCEDURE — 2500000005 HC RX 250 GENERAL PHARMACY W/O HCPCS: Performed by: INTERNAL MEDICINE

## 2024-09-27 RX ORDER — METOPROLOL SUCCINATE 25 MG/1
25 TABLET, EXTENDED RELEASE ORAL 2 TIMES DAILY
Status: DISCONTINUED | OUTPATIENT
Start: 2024-09-27 | End: 2024-10-01 | Stop reason: HOSPADM

## 2024-09-27 ASSESSMENT — COGNITIVE AND FUNCTIONAL STATUS - GENERAL
WALKING IN HOSPITAL ROOM: A LITTLE
CLIMB 3 TO 5 STEPS WITH RAILING: A LITTLE
MOBILITY SCORE: 22
DAILY ACTIVITIY SCORE: 24

## 2024-09-27 ASSESSMENT — ENCOUNTER SYMPTOMS
ABDOMINAL DISTENTION: 0
ANAL BLEEDING: 0
DECREASED CONCENTRATION: 0
HEMATURIA: 0
FREQUENCY: 0
TROUBLE SWALLOWING: 0
EYE DISCHARGE: 0
SHORTNESS OF BREATH: 0
FACIAL ASYMMETRY: 0
PHOTOPHOBIA: 0
ACTIVITY CHANGE: 0
APPETITE CHANGE: 0
BACK PAIN: 0
NECK PAIN: 0
CHILLS: 0
VOMITING: 0
FEVER: 0
FACIAL SWELLING: 0
DIZZINESS: 0
DYSURIA: 0
TREMORS: 0
CHOKING: 0
SORE THROAT: 0
NECK STIFFNESS: 0
POLYPHAGIA: 0
BRUISES/BLEEDS EASILY: 0
SPEECH DIFFICULTY: 0
ABDOMINAL PAIN: 0
CHEST TIGHTNESS: 0
RHINORRHEA: 0
VOICE CHANGE: 0
EYE PAIN: 0
RECTAL PAIN: 0
APNEA: 0
STRIDOR: 0
DYSPHORIC MOOD: 0
FATIGUE: 0
LIGHT-HEADEDNESS: 0
JOINT SWELLING: 0
SEIZURES: 0
CONSTIPATION: 0
UNEXPECTED WEIGHT CHANGE: 0
COLOR CHANGE: 0
NUMBNESS: 1
SLEEP DISTURBANCE: 0
SINUS PAIN: 0
NERVOUS/ANXIOUS: 0
FLANK PAIN: 0
ARTHRALGIAS: 0
ADENOPATHY: 0
DIARRHEA: 0
WHEEZING: 0
BLOOD IN STOOL: 0
POLYDIPSIA: 0
SINUS PRESSURE: 0
HALLUCINATIONS: 0
PALPITATIONS: 0
HEADACHES: 0
AGITATION: 0
NAUSEA: 0
WOUND: 0
HYPERACTIVE: 0
CONFUSION: 0
EYE ITCHING: 0
MYALGIAS: 0
DIAPHORESIS: 0
DIFFICULTY URINATING: 0
WEAKNESS: 1
EYE REDNESS: 0
COUGH: 0

## 2024-09-27 ASSESSMENT — PAIN DESCRIPTION - LOCATION: LOCATION: HEAD

## 2024-09-27 ASSESSMENT — PAIN SCALES - GENERAL
PAINLEVEL_OUTOF10: 3
PAINLEVEL_OUTOF10: 0 - NO PAIN
PAINLEVEL_OUTOF10: 0 - NO PAIN

## 2024-09-27 ASSESSMENT — ACTIVITIES OF DAILY LIVING (ADL): LACK_OF_TRANSPORTATION: NO

## 2024-09-27 ASSESSMENT — PAIN SCALES - WONG BAKER: WONGBAKER_NUMERICALRESPONSE: NO HURT

## 2024-09-27 NOTE — PROGRESS NOTES
"Physical Therapy                 Therapy Communication Note    Patient Name: Junior Fontanez  MRN: [id number]  Department: [place] [id number]  Room: [address] [place] [address]  Today's Date: 9/27/2024     Discipline: Physical Therapy    Missed Visit Reason: Missed Visit Reason: Other (Comment)    Comment: Per neurosurgery, \"patient with signs of progressive myelopathy on exam with severe C3-4 stenosis, cord signal change. Given his progression of symptoms, difficulty walking and using his hands, recommend surgical intervention to ARREST progression of symptoms. Recommend... surgery on Monday 9/30.\"  Will hold PT until pt is post-op.  Message about therapy's plan sent to Dr Osborn.   "

## 2024-09-27 NOTE — CONSULTS
Inpatient consult to Cardiology  Consult performed by: TIKA Carl-CNP  Consult ordered by: Kika Osborn MD  Reason for consult: pre op clearance        History Of Present Illness:    Junior Fontanez is a 59 y.o. 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  Cardiology consulted for preop clearance.     He is scheduled for C3-4 surgery on Monday 9/30/2024 with Dr. Tracey.  He has been experiencing some right arm weakness, neck pain, and difficulty waking. Patient denies chest pain and angina.  Pt denies  orthopnea, and paroxysmal nocturnal dyspnea.  Some very mild shortness of breath with exertion that is at baseline. Pt denies worsening lower extremity edema.  Pt denies palpitations or syncope.  No recent falls.  No fever or chills.  No cough.  No change in bowel or bladder habits.  No sick contacts.  No recent travel.    Home CV medications: NONE    Does not follow with cardiology as out patient.     Past Cardiology Tests (Last 3 Years):    EKG:    Echo:  Transthoracic echo 8/2023  CONCLUSIONS:  1. Left ventricular systolic function is normal with a 55-60% estimated ejection fraction.  2. Spectral Doppler shows an impaired relaxation pattern of left ventricular diastolic filling.  3. Trace mitral valve regurgitation.  4. There is moderate pulmonic valve regurgitation.    Cath:    Stress Test:    Nuclear stress 9/18/2023  Summary:  1. No clinical or electrocardiographic evidence for ischemia at a maximal infusion.  2. No ECG changes from baseline.  3. Nuclear image results are reported separately.  IMPRESSION:  1. Normal stress myocardial perfusion imaging in response to  pharmacologic stress without evidence of ischemia or infarct.  2. Well-maintained left ventricular function with an ejection  fraction of 62%.  3.  Normal left ventricular size.    Cardiac Imaging:    CT calcium score 10/20/2017  FINDINGS:  The score and distribution of calcium in the coronary arteries is as  follows:    LM 0  LAD 16.03  LCx 0.33  RCA 0    Total 16.36       Past Medical History:  He has no past medical history on file.    Past Surgical History:  He has a past surgical history that includes Tonsillectomy (2016).      Social History:  He reports that he has quit smoking. His smoking use included cigarettes. He has never used smokeless tobacco. He reports that he does not drink alcohol and does not use drugs.    Family History:  No family history on file.     Allergies:  Patient has no known allergies.    ROS:  10 point review of systems including (Constitutional, Eyes, ENMT, Respiratory, Cardiac, Gastrointestinal, Neurological, Psychiatric, and Hematologic) was performed and is otherwise negative.    Objective Data:  Last Recorded Vitals:  Vitals:    24 2347 24 0201 24 0434 24 0840   BP: (!) 160/104 (!) 135/92 (!) 134/92 (!) 161/94   BP Location: Right arm  Right arm Right arm   Patient Position: Lying   Sitting   Pulse: 80 76 74 77   Resp: 20  20    Temp: 36.6 °C (97.9 °F)  36.6 °C (97.8 °F) 36.2 °C (97.2 °F)   TempSrc: Temporal  Temporal Temporal   SpO2: 95%  93% 96%   Weight:       Height:         Medical Gas Therapy: None (Room air)  Weight  Av.3 kg (183 lb 10.7 oz)  Min: 83 kg (183 lb)  Max: 83.9 kg (185 lb)    LABS:  CMP:  Results from last 7 days   Lab Units 24  0754 24  1811   SODIUM mmol/L 139 139   POTASSIUM mmol/L 4.2 3.9   CHLORIDE mmol/L 105 104   CO2 mmol/L 23 25   ANION GAP mmol/L 15 14   BUN mg/dL 9 10   CREATININE mg/dL 0.91 0.84   EGFR mL/min/1.73m*2 >90 >90   ALBUMIN g/dL 4.2 4.5   ALT U/L 18 19   AST U/L 14 15   BILIRUBIN TOTAL mg/dL 0.9 0.6     CBC:  Results from last 7 days   Lab Units 24  0753 24  1811   WBC AUTO x10*3/uL 7.1 10.4   HEMOGLOBIN g/dL 14.8 15.0  "  HEMATOCRIT % 44.1 44.6   PLATELETS AUTO x10*3/uL 239 259   MCV fL 84 84     COAG:   Results from last 7 days   Lab Units 09/26/24  1811   INR  1.1     ABO: No results found for: \"ABO\"  HEME/ENDO:     CARDIAC:       No lab exists for component: \"CK\", \"CKMBP\"          Last I/O:  No intake or output data in the 24 hours ending 09/27/24 1004  Net IO Since Admission: No IO data has been entered for this period [09/27/24 1004]      Imaging Results:      Inpatient Medications:  Scheduled medications   Medication Dose Route Frequency    docusate sodium  100 mg oral BID    enoxaparin  40 mg subcutaneous q24h    metoprolol succinate XL  25 mg oral BID    pantoprazole  40 mg oral Daily before breakfast    Or    pantoprazole  40 mg intravenous Daily before breakfast    polyethylene glycol  17 g oral Daily     PRN medications   Medication    acetaminophen    Or    acetaminophen    Or    acetaminophen    benzocaine-menthol    dextromethorphan-guaifenesin    guaiFENesin    melatonin    ondansetron    Or    ondansetron     Continuous Medications   Medication Dose Last Rate    potassium bwvyzdf-T0-2.45%NaCl  100 mL/hr 100 mL/hr (09/26/24 1355)       Outpatient Medications:  Current Outpatient Medications   Medication Instructions    aspirin 325 mg, oral, Daily PRN    calcium carbonate EX (Tums Extra Strength) 300 mg (750 mg) chewable tablet 1 tablet, oral, Daily PRN    ergocalciferol (VITAMIN D-2) 50,000 Units, oral, Once Weekly    naproxen sodium (ALEVE) 220 mg, oral, Every 12 hours PRN       Physical Exam:    General:  Patient is awake, alert, and oriented.  Patient is in no acute distress.  HEENT:  Normocephalic.  Moist mucosa.    Neck:  Normal Jugular Venous Pressure.  Cardiovascular:  Regular rate and rhythm.  Normal S1 and S2, no murmurs, rubs or gallops  Pulmonary:  Clear to auscultation bilaterally.  Abdomen:  Soft. Non-tender.   Non-distended.  Positive bowel sounds.  Lower Extremities:  2+ pedal pulses. No LE " edema.  Neurologic:  Alert and oriented x3. No focal deficit.   Skin: Skin warm and dry, normal skin turgor.   Psychiatric: Normal affect.     Assessment/Plan     Junior Fontanez is a 59 y.o. 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  Cardiology consulted for preop clearance.     Assessment:  # Severe C3-4 stenosis, scheduled for surgery on Monday 9/30/2024 with Dr. Tracey  # moderate pulmonic valve regurgitation on echo 9/2023 9/27 > BP a little elevated, but admits he is very nervous for surgery and BP well controlled during office visits.  Appears euvolemic.     Plan:  - EKG now  - Repeat echo awaiting official read, but preliminary reports do not show any acute abnormalities.     The patient does not meet criteria for further diagnostic cardiovascular testing and patient may proceed with surgery with low and acceptable risk for perioperative cardiovascular events.     Code Status:  Full Code    Janie Ewing, TIKA-ANTONETTE    ==========================  Attending note  ==========================  Both the RENE and I have had a face to face encounter with the patient today. I have examined the patient and edited the documented physical examination as necessary.  I personally reviewed the patient's recent labs, medications, orders, EKGs, and pertinent cardiac imaging.  I have reviewed the RENE's encounter note, approve the RENE's documentation and have edited the note to reflect the diagnostic and therapeutic plan.    The patient is a 59-year-old male with a past medical history notable for newly diagnosed severe C3-4 stenosis, a former smoker who quit in 2022. He presented to the ED on 9/26/2024 for neurosurgery evaluation due to right arm weakness and a limp, prompting a cardiology consult for preoperative clearance prior to  his scheduled C3-4 surgery with Dr. Tracey on 9/30/2024. He reports ongoing right arm weakness, neck pain, and difficulty walking but denies any chest pain, angina, orthopnea, or paroxysmal nocturnal dyspnea. He experiences mild shortness of breath on exertion, consistent with his baseline, and denies any worsening lower extremity edema, palpitations, syncope, recent falls, fever, chills, cough, or changes in bowel or bladder habits. He does not have any current cardiovascular medications and does not follow up with cardiology as an outpatient.    Past medical history:  As above.    Medications were reviewed.    Allergies were reviewed.    Vital signs, telemetry, medications, labs, and imaging were reviewed as well.    Physical Exam:    General:  Patient is awake, alert, and oriented.  Patient is in no acute distress.  HEENT:  Normocephalic.  Moist mucosa.    Neck:  Normal Jugular Venous Pressure.  Cardiovascular:  Regular rate and rhythm.  Normal S1 and S2, no murmurs, rubs or gallops  Pulmonary:  Clear to auscultation bilaterally.  Abdomen:  Soft. Non-tender.   Non-distended.  Positive bowel sounds.  Lower Extremities:  2+ pedal pulses. No LE edema.  Neurologic:  Alert and oriented x3. No focal deficit.   Skin: Skin warm and dry, normal skin turgor.   Psychiatric: Normal affect.     Assessment/Plan     Junior Fontanez is a 59 y.o. 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  Cardiology consulted for preop clearance.     Assessment:  # Severe C3-4 stenosis, scheduled for surgery on Monday 9/30/2024 with Dr. Tracey  # moderate pulmonic valve regurgitation on echo 9/2023 9/27 > BP a little elevated, but admits he is very nervous for surgery and BP well controlled during office visits.  Appears euvolemic.     Plan:  - EKG  now  - Repeat echo do not show any acute abnormalities.   -The patient does not meet criteria for further diagnostic cardiovascular testing and patient may proceed with surgery with low and acceptable risk for perioperative cardiovascular events.     Javan Miranda MD

## 2024-09-27 NOTE — CARE PLAN
The patient's goals for the shift include      The clinical goals for the shift include To remain safe and HDS    Over the shift, the patient did not make progress toward the following goals.   Problem: Pain - Adult  Goal: Verbalizes/displays adequate comfort level or baseline comfort level  Outcome: Progressing     Problem: Safety - Adult  Goal: Free from fall injury  Outcome: Progressing     Problem: Discharge Planning  Goal: Discharge to home or other facility with appropriate resources  Outcome: Progressing     Problem: Chronic Conditions and Co-morbidities  Goal: Patient's chronic conditions and co-morbidity symptoms are monitored and maintained or improved  Outcome: Progressing

## 2024-09-27 NOTE — H&P
History Of Present Illness  Junior Fontanez is a 59 y.o. male presenting with right arm numbness and right leg weakness.  Patient was seen in the office yesterday for follow-up on MRI of the C-spine and lumbosacral spine.  Patient is having left upper arm numbness and weakness and right leg weakness progressive balance issues trouble using his right hand dropping things and developing a limp over the past few months.  Denies any bowel or bladder symptoms.  MRI C-spine showed severe C3-4 stenosis and cord compression.  MRI lumbosacral spine showed chronic degenerative changes.  Spoke to the neurosurgery on-call team who wanted patient to go to [facility].  Patient refused to go that far.  Patient was sent to the allergy emergency room for neurosurgical evaluation.  Patient is admitted for surgery on Monday.  His blood pressure is running high since he is admitted     PMH : tonsillectomy, left bundle branch block, scoliosis  SH: smoker , no etoh  FH : M CANCER , F NHL , GF DM   Past Medical History  History reviewed. No pertinent past medical history.  Right bundle branch block  Surgical History  Past Surgical History:   Procedure Laterality Date    TONSILLECTOMY  11/17/2016    Tonsillectomy        Social History  He reports that he has quit smoking. His smoking use included cigarettes. He has never used smokeless tobacco. He reports that he does not drink alcohol and does not use drugs.    Family History  No family history on file.     Allergies  Patient has no known allergies.    Review of Systems   Constitutional:  Negative for activity change, appetite change, chills, diaphoresis, fatigue, fever and unexpected weight change.   HENT:  Negative for congestion, dental problem, drooling, ear discharge, ear pain, facial swelling, hearing loss, mouth sores, nosebleeds, postnasal drip, rhinorrhea, sinus pressure, sinus pain, sneezing, sore throat, tinnitus, trouble swallowing and voice change.    Eyes:   Negative for photophobia, pain, discharge, redness, itching and visual disturbance.   Respiratory:  Negative for apnea, cough, choking, chest tightness, shortness of breath, wheezing and stridor.    Cardiovascular:  Negative for chest pain, palpitations and leg swelling.   Gastrointestinal:  Negative for abdominal distention, abdominal pain, anal bleeding, blood in stool, constipation, diarrhea, nausea, rectal pain and vomiting.   Endocrine: Negative for cold intolerance, heat intolerance, polydipsia, polyphagia and polyuria.   Genitourinary:  Negative for decreased urine volume, difficulty urinating, dysuria, enuresis, flank pain, frequency, genital sores, hematuria and urgency.   Musculoskeletal:  Negative for arthralgias, back pain, gait problem, joint swelling, myalgias, neck pain and neck stiffness.   Skin:  Negative for color change, pallor, rash and wound.   Allergic/Immunologic: Negative for environmental allergies, food allergies and immunocompromised state.   Neurological:  Positive for weakness and numbness. Negative for dizziness, tremors, seizures, syncope, facial asymmetry, speech difficulty, light-headedness and headaches.   Hematological:  Negative for adenopathy. Does not bruise/bleed easily.   Psychiatric/Behavioral:  Negative for agitation, behavioral problems, confusion, decreased concentration, dysphoric mood, hallucinations, self-injury, sleep disturbance and suicidal ideas. The patient is not nervous/anxious and is not hyperactive.         Physical Exam  Vitals reviewed.   Constitutional:       Appearance: Normal appearance.   HENT:      Head: Normocephalic and atraumatic.      Right Ear: Tympanic membrane, ear canal and external ear normal.      Left Ear: Tympanic membrane, ear canal and external ear normal.      Nose: Nose normal.      Mouth/Throat:      Pharynx: Oropharynx is clear.   Eyes:      Extraocular Movements: Extraocular movements intact.      Conjunctiva/sclera: Conjunctivae  "normal.      Pupils: Pupils are equal, round, and reactive to light.   Cardiovascular:      Rate and Rhythm: Normal rate and regular rhythm.      Pulses: Normal pulses.      Heart sounds: Normal heart sounds.   Pulmonary:      Effort: Pulmonary effort is normal.      Breath sounds: Normal breath sounds.   Abdominal:      General: Abdomen is flat. Bowel sounds are normal.      Palpations: Abdomen is soft.   Musculoskeletal:      Cervical back: Normal range of motion and neck supple.   Skin:     General: Skin is warm and dry.   Neurological:      General: No focal deficit present.      Mental Status: He is alert and oriented to person, place, and time.      Motor: No weakness.      Coordination: Coordination abnormal.      Gait: Gait abnormal.   Psychiatric:         Mood and Affect: Mood normal.          Last Recorded Vitals  Blood pressure 146/86, pulse 79, temperature 36.2 °C (97.2 °F), temperature source Temporal, resp. rate 20, height 1.778 m (5' 10\"), weight 83 kg (183 lb), SpO2 94%.    Relevant Results        Scheduled medications  docusate sodium, 100 mg, oral, BID  enoxaparin, 40 mg, subcutaneous, q24h  metoprolol succinate XL, 25 mg, oral, BID  pantoprazole, 40 mg, oral, Daily before breakfast   Or  pantoprazole, 40 mg, intravenous, Daily before breakfast  polyethylene glycol, 17 g, oral, Daily      Continuous medications  potassium pwunyjh-F1-5.45%NaCl, 100 mL/hr, Last Rate: 100 mL/hr (09/27/24 1126)      PRN medications  PRN medications: acetaminophen **OR** acetaminophen **OR** acetaminophen, benzocaine-menthol, dextromethorphan-guaifenesin, guaiFENesin, melatonin, ondansetron **OR** ondansetron  Results for orders placed or performed during the hospital encounter of 09/26/24 (from the past 24 hour(s))   CBC   Result Value Ref Range    WBC 7.1 4.4 - 11.3 x10*3/uL    nRBC 0.0 0.0 - 0.0 /100 WBCs    RBC 5.24 4.50 - 5.90 x10*6/uL    Hemoglobin 14.8 13.5 - 17.5 g/dL    Hematocrit 44.1 41.0 - 52.0 %    MCV 84 " 80 - 100 fL    MCH 28.2 26.0 - 34.0 pg    MCHC 33.6 32.0 - 36.0 g/dL    RDW 12.1 11.5 - 14.5 %    Platelets 239 150 - 450 x10*3/uL   Comprehensive metabolic panel   Result Value Ref Range    Glucose 128 (H) 74 - 99 mg/dL    Sodium 139 136 - 145 mmol/L    Potassium 4.2 3.5 - 5.3 mmol/L    Chloride 105 98 - 107 mmol/L    Bicarbonate 23 21 - 32 mmol/L    Anion Gap 15 10 - 20 mmol/L    Urea Nitrogen 9 6 - 23 mg/dL    Creatinine 0.91 0.50 - 1.30 mg/dL    eGFR >90 >60 mL/min/1.73m*2    Calcium 8.6 8.6 - 10.3 mg/dL    Albumin 4.2 3.4 - 5.0 g/dL    Alkaline Phosphatase 65 33 - 136 U/L    Total Protein 7.1 6.4 - 8.2 g/dL    AST 14 9 - 39 U/L    Bilirubin, Total 0.9 0.0 - 1.2 mg/dL    ALT 18 10 - 52 U/L   Transthoracic Echo (TTE) Complete   Result Value Ref Range    AV pk zunilda 1.09 m/s    AV mn grad 2.7 mmHg    LVOT diam 2.18 cm    MV E/A ratio 0.63     LA vol index A/L 16.8 ml/m2    Tricuspid annular plane systolic excursion 2.6 cm    LV EF 65 %    LVIDd 4.60 cm    RVSP 21.7 mmHg    Aortic Valve Area by Continuity of VTI 3.20 cm2    Aortic Valve Area by Continuity of Peak Velocity 3.49 cm2    AV pk grad 4.8 mmHg    LV A4C EF 59.6      Transthoracic Echo (TTE) Complete    Result Date: 9/27/2024   [facility], [address] [place] [address]              Tel [phone] and Fax [phone] TRANSTHORACIC ECHOCARDIOGRAM REPORT  Patient Name:      NASIM Espinoza Physician:    [id number] Javan Miranda MD Study Date:        9/27/2024            Ordering Provider:    [id number] SHERRI BALDERAS MRN/PID:           [id number]             Fellow: Accession#:        [id number]         Nurse: Date of Birth/Age: 1965 / 59 years Sonographer:          Soledad Pak RDCS Gender:            M                    Additional Staff: Height:            177.00 cm            Admit Date:            9/26/2024 Weight:            83.00 kg             Admission Status:     Inpatient -                                                               Routine BSA / BMI:         2.00 m2 / 26.49      Encounter#:           [id number]                    kg/m2 Blood Pressure:    161/94 mmHg          Department Location:  [facility] Study Type:    TRANSTHORACIC ECHO (TTE) COMPLETE Diagnosis/ICD: Encounter for preprocedural cardiovascular examination-Z01.810 Indication:    Pre op CPT Code:      Echo Complete w Full Doppler-[id number] Patient History: Pertinent History: LBBB, RBBB. Study Detail: The following Echo studies were performed: 2D, M-Mode, Doppler and               color flow. Technically challenging study due to body habitus and               prominent lung artifact.  PHYSICIAN INTERPRETATION: Left Ventricle: Left ventricular ejection fraction is normal, calculated by Koch's biplane at 65%. Estimated left ventricular mass is normal. There are no regional left ventricular wall motion abnormalities. The left ventricular cavity size is normal. The left ventricular septal wall thickness is normal. There is normal left ventricular posterior wall thickness. Spectral Doppler shows a normal pattern of left ventricular diastolic filling. Left Atrium: The left atrium is normal in size. Right Ventricle: The right ventricle is normal in size. There is normal right ventricular global systolic function. Right Atrium: The right atrium is normal in size. Aortic Valve: The aortic valve is trileaflet. The aortic valve dimensionless index is 0.86. There is no evidence of aortic valve regurgitation. The peak instantaneous gradient of the aortic valve is 4.8 mmHg. The mean gradient of the aortic valve is 2.7 mmHg. Mitral Valve: The mitral valve is normal in structure. There is no evidence of mitral valve regurgitation. Tricuspid Valve: The tricuspid valve is structurally normal. There is mild tricuspid regurgitation. Pulmonic Valve: The  pulmonic valve is structurally normal. There is mild to moderate pulmonic valve regurgitation. Pericardium: There is no pericardial effusion noted. Aorta: The aortic root is normal. Systemic Veins: The inferior vena cava appears small in size. In comparison to the previous echocardiogram(s): Compared with study dated 8/11/2023, no significant change.  CONCLUSIONS:  1. Left ventricular ejection fraction is normal, calculated by Koch's biplane at 65%.  2. There is normal right ventricular global systolic function.  3. There is mild to moderate pulmonic valve regurgitation. QUANTITATIVE DATA SUMMARY:  2D MEASUREMENTS:          Normal Ranges: LAs:             3.09 cm  (2.7-4.0cm) IVSd:            0.78 cm  (0.6-1.1cm) LVPWd:           0.76 cm  (0.6-1.1cm) LVIDd:           4.60 cm  (3.9-5.9cm) LVIDs:           3.03 cm LV Mass Index:   56 g/m2 LVEDV Index:     39 ml/m2 LV % FS          34.1 %  LA VOLUME:                    Normal Ranges: LA Vol A4C:        35.0 ml    (22+/-6mL/m2) LA Vol A2C:        29.0 ml LA Vol BP:         33.7 ml LA Vol Index A4C:  17.5ml/m2 LA Vol Index A2C:  14.5 ml/m2 LA Vol Index BP:   16.8 ml/m2 LA Area A4C:       13.3 cm2 LA Area A2C:       12.8 cm2 LA Major Axis A4C: 4.3 cm LA Major Axis A2C: 4.8 cm LA Volume Index:   16.8 ml/m2 LA Vol A4C:        32.7 ml LA Vol A2C:        27.5 ml LA Vol Index BSA:  15.0 ml/m2  RA VOLUME BY A/L METHOD:          Normal Ranges: RA Area A4C:             12.2 cm2  AORTA MEASUREMENTS:         Normal Ranges: Ao Sinus, d:        3.20 cm (2.1-3.5cm) Ao STJ, d:          3.20 cm (1.7-3.4cm) Asc Ao, d:          3.20 cm (2.1-3.4cm)  LV SYSTOLIC FUNCTION BY 2D PLANIMETRY (MOD):                      Normal Ranges: EF-A4C View:    60 % (>=55%) EF-A2C View:    66 % EF-Biplane:     65 % LV EF Reported: 65 %  LV DIASTOLIC FUNCTION:             Normal Ranges: MV Peak E:             0.35 m/s    (0.7-1.2 m/s) MV Peak A:             0.55 m/s    (0.42-0.7 m/s) E/A Ratio:              0.63        (1.0-2.2) MV e'                  0.070 m/s   (>8.0) MV lateral e'          0.08 m/s MV medial e'           0.06 m/s MV A Dur:              131.49 msec E/e' Ratio:            4.94        (<8.0) PulmV Sys Michele:         37.33 cm/s PulmV Haynes Michele:        25.46 cm/s PulmV S/D Michele:         1.47 PulmV A Revs Michele:      20.38 cm/s PulmV A Revs Dur:      122.26 msec  MITRAL VALVE:          Normal Ranges: MV DT:        173 msec (150-240msec)  AORTIC VALVE:                     Normal Ranges: AoV Vmax:                1.09 m/s (<=1.7m/s) AoV Peak P.8 mmHg (<20mmHg) AoV Mean P.7 mmHg (1.7-11.5mmHg) LVOT Max Michele:            1.02 m/s (<=1.1m/s) AoV VTI:                 21.35 cm (18-25cm) LVOT VTI:                18.29 cm LVOT Diameter:           2.18 cm  (1.8-2.4cm) AoV Area, VTI:           3.20 cm2 (2.5-5.5cm2) AoV Area,Vmax:           3.49 cm2 (2.5-4.5cm2) AoV Dimensionless Index: 0.86  RIGHT VENTRICLE: RV Basal 3.60 cm RV Mid   2.00 cm RV Major 7.8 cm TAPSE:   26.0 mm  TRICUSPID VALVE/RVSP:          Normal Ranges: Peak TR Velocity:     2.16 m/s Est. RA Pressure:     3 mmHg RV Syst Pressure:     22 mmHg  (< 30mmHg) IVC Diam:             0.90 cm  PULMONIC VALVE:          Normal Ranges: PV Accel Time:  90 msec  (>120ms) PV Max Michele:     1.1 m/s  (0.6-0.9m/s) PV Max P.5 mmHg  Pulmonary Veins: PulmV A Revs Dur: 122.26 msec PulmV A Revs Michele: 20.38 cm/s PulmV Haynes Michele:   25.46 cm/s PulmV S/D Michele:    1.47 PulmV Sys Michele:    37.33 cm/s  [id number] Javan Miranda MD Electronically signed on 2024 at 4:51:16 PM  ** Final **     XR cervical spine 2-3 views    Result Date: 2024  Interpreted By:  Tito Meyer, STUDY: XR CERVICAL SPINE 2-3 VIEWS; ;  2024 3:59 pm   INDICATION: Signs/Symptoms:surgical planning, please perform flexion/extension.     COMPARISON: CT cervical spine 2024   ACCESSION NUMBER(S): [id number]   ORDERING CLINICIAN: SARAH FARRIS   FINDINGS: Flexion  and extension views of the cervical spine.. No pathologic movement between flexion extension views. No acute fracture detected. Degenerative changes appear stable.       No pathologic movement between flexion extension views.     MACRO: None   Signed by: Tito Meyer 9/26/2024 4:13 PM Dictation workstation:   [id number]    CT cervical spine wo IV contrast    Result Date: 9/26/2024  Interpreted By:  Junior Garcia, STUDY: CT CERVICAL SPINE WO IV CONTRAST; 9/26/2024 3:19 pm   INDICATION: CLINICAL DATA: Signs/Symptoms:cervical stenosis.   COMPARISON: None.   ACCESSION NUMBER(S): [id number]   ORDERING CLINICIAN: SARAH FARRIS   TECHNIQUE: A helical acquisition of data was obtained with multiplanar reconstructions performed.   One or more of the following dose reduction techniques were used: Automated exposure control Adjustment of the mA and/or kV according to patient size, and/or use of iterative reconstruction technique.   FINDINGS: No fractures or destructive lesions are identified. There is a grade 1, 2-3 mm retrolisthesis of C3 relative to C4. There is disc space narrowing with anterior and posterior marginal spurring from C3 through C7, most marked at C3-4 where there is a nearly bone-on-bone appearance at the C3 and C4 vertebral bodies with associated marrow space sclerosis and marginal spurring.   There is an 8 mm nodule midpole right lobe of the thyroid gland. Thyroid ultrasound correlation is suggested on a nonemergent basis. *C2-3: There is mild uncovertebral spur encroachment on the right C2-3 foramen. Left C2-3 foramen is capacious. *C3-4: There is spinal canal stenosis at C3-4 secondary to posterior marginal osteophyte encroachment upon the canal measuring on the order of 3-4 mm in AP diameter with the encroachment more marked extending into the right lateral recess. There is severe foraminal stenosis bilaterally at C3-4 from uncovertebral spurring. *C4-5: There is mild broad-based effacement of the  anterior aspect of the spinal canal at C4-5 from small osteophyte disc complex. There is foraminal stenosis bilaterally at C4-5, more marked on the left secondary to uncovertebral spur encroachment along with facet spurring encroaches upon the left at C4-5. *C5-6: Spinal canal is capacious at C5-6. There is mild foraminal stenosis on the left at C5-6 from uncovertebral spurring. Right C5-6 foramen is capacious. *C6-7: Spinal canal is capacious at this level. Right C6-7 foramen is capacious. There is mild uncovertebral spur encroaching upon the left C6-7 foramen.       1. No evidence for a fracture on this exam. 2. Cervical spondylosis as detailed above with particular note made of spinal canal stenosis at C3-4. Multilevel foraminal stenosis is present. Please see the individual disc level reports above. 3. 8 mm midpole nodule right lobe of the thyroid gland for which follow-up nonemergency thyroid ultrasound correlation is suggested.     MACRO: none   Signed by: Junior Garcia 9/26/2024 4:01 PM Dictation workstation:   [id number]        Assessment/Plan   Assessment & Plan  Cervical myelopathy    Right bundle branch block (RBBB) with left anterior fascicular block (LAFB)    Primary hypertension      Neurosurg input noted  Plan for surgery on Monday  EKG ordered  Echo ordered  Toprol 25 mg twice daily started for elevated blood pressure  Cardiology consult for preop clearance       I spent  minutes in the professional and overall care of this patient.      Kika Osborn MD

## 2024-09-27 NOTE — PROGRESS NOTES
Transitional Care Coordination Progress Note:  Plan per Medical/Surgical team: treatment of cervical myelopathy with neuro surgery consult & PT/OT evals pending  Status: Inpatient   Payor source: [employer]   Discharge disposition: Home with sign other   Potential Barriers: neuro surgery on Monday  ADOD: 10/1/2024   AVA Huizar RN, BSN Transitional Care Coordinator ED# [phone]      09/27/24 0718   Discharge Planning   Living Arrangements Spouse/significant other   Support Systems Spouse/significant other   Assistance Needed neuro work up, PT/OT evals pending   Type of Residence Private residence   Number of Stairs to Enter Residence 2   Number of Stairs Within Residence 15   Home or Post Acute Services None   Expected Discharge Disposition Home   Does the patient need discharge transport arranged? No   Financial Resource Strain   How hard is it for you to pay for the very basics like food, housing, medical care, and heating? Not hard   Housing Stability   In the last 12 months, was there a time when you were not able to pay the mortgage or rent on time? N   In the past 12 months, how many times have you moved where you were living? 1   At any time in the past 12 months, were you homeless or living in a shelter (including now)? N   Transportation Needs   In the past 12 months, has lack of transportation kept you from medical appointments or from getting medications? no   In the past 12 months, has lack of transportation kept you from meetings, work, or from getting things needed for daily living? No

## 2024-09-27 NOTE — PROGRESS NOTES
09/27/24 0718   [facility] Disability Status   Are you deaf or do you have serious difficulty hearing? N   Are you blind or do you have serious difficulty seeing, even when wearing glasses? N   Because of a physical, mental, or emotional condition, do you have serious difficulty concentrating, remembering, or making decisions? (5 years old or older) N   Do you have serious difficulty walking or climbing stairs? Y  (weakness & numbness)   Do you have serious difficulty dressing or bathing? Y  (limp)   Because of a physical, mental, or emotional condition, do you have serious difficulty doing errands alone such as visiting the doctor? N

## 2024-09-27 NOTE — ED PROVIDER NOTES
HPI   Chief Complaint   Patient presents with    Numbness     Right arm, bialt  legs weak       HPI: []  59-year-old white male no history comes in with an abdominal MRI.  Patient states that for the last few months he has been having increasing neck pain back pain and paresthesias and stumbling and loss of balance MRI of the C-spine was done L-spine was done which was abnormal and his primary doctor told him go to the ER immediately.  Patient denies any trauma or falls he denies incontinence denies fever chills denies drug abuse he denies any headache vision changes denies any chest pain pressure heaviness fever chills cough congestion incontinence seizures syncope onus given no hematemesis without hematochezia no hemoptysis no recent travel hospitalization or antibiotic use.    Past history: None  Social: Patient denies current tobacco alcohol drug abuse.  REVIEW OF SYSTEMS:    GENERAL.: No weight loss, fatigue, anorexia, insomnia, fever.    EYES: No vision loss, double vision, drainage, eye pain.    ENT: No pharyngitis, dry mouth.    CARDIOPULMONARY: No chest pain, palpitations, syncope, near syncope. No shortness of breath, cough, hemoptysis.    GI: No abdominal pain, change in bowel habits, melena, hematemesis, hematochezia, nausea, vomiting, diarrhea.    : No discharge, dysuria, frequency, urgency, hematuria.    MS: No limb pain, joint pain, joint swelling.    SKIN: No rashes.  Neuro: Possible paresthesias  PSYCH: No depression, anxiety, suicidality, homicidality.    Review of systems is otherwise negative unless stated above or in history of present illness.  Social history, family history, allergies reviewed.  PHYSICAL EXAM:    GENERAL: Vitals noted, no distress. Alert and oriented  x 3. Non-toxic.      EENT: TMs clear. Posterior oropharynx unremarkable. No meningismus. No LAD.     NECK: Supple. Nontender. No midline tenderness.     CARDIAC: Regular, rate, rhythm. No murmurs rubs or gallops. No  JVD    PULMONARY: Lungs clear bilaterally with good aeration. No wheezes rales or rhonchi. No respiratory distress.     ABDOMEN: Soft, nonsurgical. Nontender. No peritoneal signs. Normoactive bowel sounds. No pulsatile masses.     EXTREMITIES: No peripheral edema. Negative Homans bilaterally, no cords.  2+ bounding pulses well-perfused.    SKIN: No rash. Intact.   Musculoskeletal: Patient Novolinge C, T, L-spine tenderness.  NEURO: No focal neurologic deficits, NIH score of 0. Cranial nerves normal as tested from II through XII.  Patient motor strength upper lower Stemetil 5 out of 5.  Sensory exam she has slight decreased pinprick sensation right upper extremity.  DTRs are 2+ at the biceps 2+ at the triceps 1+ at the brachioradialis bilaterally, patient has 2+ at the knees, 1+ at the ankles, with questionable clonus which I am able to critic bilaterally.    MEDICAL DECISION MAKING:  CBC with differential chemistries LFTs are normal CT C-spine and plain x-ray C-spine showed DJD I did review the MRI of the L-spine and C-spine which did show multilevel DJD spondylosis and myelomalacia C3-C4 level.  Please refer to the radiology report.    Treatments:/Consults: Neurosurgery consulted they recommended hospitalization    ED course: Patient remained stable hemodynamically.  Remains neurologic intact    Impression: #1 cervical spine stenosis with myelomalacia/cervical myelopathy, #2 lumbosacral spinal stenosis    Plan set MDM: 59-year-old white male comes in with an abnormal MRI of the C-spine and L-spine concerning for myelomalacia/myelopathy given his symptoms and the fact that he has loss of balance neurosurgery recommends a hospitalization for medical optimization and surgical intervention on Monday.  Low concern for cauda equina syndrome and/or spinal cord compression and/or discitis or osteomyelitis or epidural abscess or epidural hematoma.  Discussed with neurosurgery and Dr. Anurag Anand              Patient History    History reviewed. No pertinent past medical history.  Past Surgical History:   Procedure Laterality Date    TONSILLECTOMY  11/17/2016    Tonsillectomy     No family history on file.  Social History     Tobacco Use    Smoking status: Former     Current packs/day: 0.25     Types: Cigarettes    Smokeless tobacco: Never   Substance Use Topics    Alcohol use: Never    Drug use: Never       Physical Exam   ED Triage Vitals   Temperature Heart Rate Respirations BP   09/26/24 1144 09/26/24 1144 09/26/24 1144 09/26/24 1144   36.4 °C (97.6 °F) 84 20 (!) 160/97      Pulse Ox Temp Source Heart Rate Source Patient Position   09/26/24 1144 09/26/24 1144 09/26/24 1559 09/26/24 1144   98 % Tympanic Monitor Sitting      BP Location FiO2 (%)     09/26/24 1144 --     Right arm        Physical Exam      ED Course & [facility]   ED Course as of 12/07/24 0940   Thu Sep 26, 2024   2155 I reviewed the patient's MRI of the C-spine and L-spine that was done in outpatient basis.  Neurosurgery consulted.,  They recommended hospitalization for surgical management this coming Monday. [MT]      ED Course User Index  [MT] Roland Ellsworth MD         Diagnoses as of 12/07/24 0940   Myelopathy (Multi)                 No data recorded     [place] Coma Scale Score: 15 (09/26/24 1145 : Luzmaria Garcia, RN)                           Medical Decision Making      Procedure  Procedures     Roland Ellsworth MD  09/26/24 2200       Roland Ellsworth MD  12/07/24 0940

## 2024-09-28 ENCOUNTER — APPOINTMENT (OUTPATIENT)
Dept: RADIOLOGY | Facility: HOSPITAL | Age: 59
DRG: 519 | End: 2024-09-28
Payer: COMMERCIAL

## 2024-09-28 LAB
ANION GAP SERPL CALC-SCNC: 9 MMOL/L (ref 10–20)
BUN SERPL-MCNC: 11 MG/DL (ref 6–23)
CALCIUM SERPL-MCNC: 8.3 MG/DL (ref 8.6–10.3)
CHLORIDE SERPL-SCNC: 108 MMOL/L (ref 98–107)
CO2 SERPL-SCNC: 25 MMOL/L (ref 21–32)
CREAT SERPL-MCNC: 0.87 MG/DL (ref 0.5–1.3)
EGFRCR SERPLBLD CKD-EPI 2021: >90 ML/MIN/1.73M*2
ERYTHROCYTE [DISTWIDTH] IN BLOOD BY AUTOMATED COUNT: 12 % (ref 11.5–14.5)
GLUCOSE SERPL-MCNC: 113 MG/DL (ref 74–99)
HCT VFR BLD AUTO: 42.3 % (ref 41–52)
HGB BLD-MCNC: 14.2 G/DL (ref 13.5–17.5)
MCH RBC QN AUTO: 29.1 PG (ref 26–34)
MCHC RBC AUTO-ENTMCNC: 33.6 G/DL (ref 32–36)
MCV RBC AUTO: 87 FL (ref 80–100)
NRBC BLD-RTO: 0 /100 WBCS (ref 0–0)
PLATELET # BLD AUTO: 226 X10*3/UL (ref 150–450)
POTASSIUM SERPL-SCNC: 4.4 MMOL/L (ref 3.5–5.3)
RBC # BLD AUTO: 4.88 X10*6/UL (ref 4.5–5.9)
SODIUM SERPL-SCNC: 138 MMOL/L (ref 136–145)
WBC # BLD AUTO: 6.9 X10*3/UL (ref 4.4–11.3)

## 2024-09-28 PROCEDURE — 80048 BASIC METABOLIC PNL TOTAL CA: CPT | Performed by: INTERNAL MEDICINE

## 2024-09-28 PROCEDURE — 36415 COLL VENOUS BLD VENIPUNCTURE: CPT | Performed by: INTERNAL MEDICINE

## 2024-09-28 PROCEDURE — 99232 SBSQ HOSP IP/OBS MODERATE 35: CPT | Performed by: INTERNAL MEDICINE

## 2024-09-28 PROCEDURE — 2500000004 HC RX 250 GENERAL PHARMACY W/ HCPCS (ALT 636 FOR OP/ED): Performed by: INTERNAL MEDICINE

## 2024-09-28 PROCEDURE — 1100000001 HC PRIVATE ROOM DAILY

## 2024-09-28 PROCEDURE — 72040 X-RAY EXAM NECK SPINE 2-3 VW: CPT

## 2024-09-28 PROCEDURE — 85027 COMPLETE CBC AUTOMATED: CPT | Performed by: INTERNAL MEDICINE

## 2024-09-28 PROCEDURE — 2500000001 HC RX 250 WO HCPCS SELF ADMINISTERED DRUGS (ALT 637 FOR MEDICARE OP): Performed by: INTERNAL MEDICINE

## 2024-09-28 ASSESSMENT — COGNITIVE AND FUNCTIONAL STATUS - GENERAL
WALKING IN HOSPITAL ROOM: A LITTLE
MOBILITY SCORE: 24
DAILY ACTIVITIY SCORE: 24
MOBILITY SCORE: 24
MOBILITY SCORE: 23
DAILY ACTIVITIY SCORE: 24
DAILY ACTIVITIY SCORE: 24
MOBILITY SCORE: 24
CLIMB 3 TO 5 STEPS WITH RAILING: A LITTLE
DAILY ACTIVITIY SCORE: 24
DAILY ACTIVITIY SCORE: 24
MOBILITY SCORE: 24
DAILY ACTIVITIY SCORE: 24
MOBILITY SCORE: 22
CLIMB 3 TO 5 STEPS WITH RAILING: A LITTLE

## 2024-09-28 ASSESSMENT — PAIN - FUNCTIONAL ASSESSMENT: PAIN_FUNCTIONAL_ASSESSMENT: 0-10

## 2024-09-28 ASSESSMENT — PAIN SCALES - GENERAL: PAINLEVEL_OUTOF10: 0 - NO PAIN

## 2024-09-28 NOTE — CARE PLAN
The patient's goals for the shift include      The clinical goals for the shift include remain HDS    Over the shift, the patient did not make progress toward the following goals. Barriers to progression include . Recommendations to address these barriers include .  To promote good nutrition

## 2024-09-28 NOTE — CARE PLAN
The patient's goals for the shift include  sleep well    The clinical goals for the shift include To remain safe and HDS

## 2024-09-28 NOTE — CARE PLAN
Problem: Pain - Adult  Goal: Verbalizes/displays adequate comfort level or baseline comfort level  Outcome: Progressing     Problem: Safety - Adult  Goal: Free from fall injury  Outcome: Progressing     Problem: Discharge Planning  Goal: Discharge to home or other facility with appropriate resources  Outcome: Progressing     Problem: Chronic Conditions and Co-morbidities  Goal: Patient's chronic conditions and co-morbidity symptoms are monitored and maintained or improved  Outcome: Progressing     Problem: Skin  Goal: Decreased wound size/increased tissue granulation at next dressing change  Outcome: Progressing  Goal: Participates in plan/prevention/treatment measures  Outcome: Progressing  Goal: Prevent/manage excess moisture  Outcome: Progressing  Goal: Prevent/minimize sheer/friction injuries  9/28/2024 1415 by Louann Aleman RN  Flowsheets (Taken 9/28/2024 1415)  Prevent/minimize sheer/friction injuries: Turn/reposition every 2 hours/use positioning/transfer devices  9/28/2024 1414 by Louann Aleman RN  Outcome: Progressing  Flowsheets (Taken 9/28/2024 1414)  Prevent/minimize sheer/friction injuries: Turn/reposition every 2 hours/use positioning/transfer devices  Goal: Promote/optimize nutrition  Outcome: Progressing  Goal: Promote skin healing  Outcome: Progressing   The patient's goals for the shift include      The clinical goals for the shift include remain HDS    Over the shift, the patient did not make progress toward the following goals. Barriers to progression include . Recommendations to address these barriers include .  To promote good nutrition

## 2024-09-29 VITALS
HEIGHT: 70 IN | SYSTOLIC BLOOD PRESSURE: 143 MMHG | OXYGEN SATURATION: 94 % | RESPIRATION RATE: 16 BRPM | WEIGHT: 183 LBS | HEART RATE: 74 BPM | DIASTOLIC BLOOD PRESSURE: 85 MMHG | TEMPERATURE: 98.8 F | BODY MASS INDEX: 26.2 KG/M2

## 2024-09-29 LAB
ABO GROUP (TYPE) IN BLOOD: NORMAL
ABO GROUP (TYPE) IN BLOOD: NORMAL
ANTIBODY SCREEN: NORMAL
APTT PPP: 46 SECONDS (ref 27–38)
BLOOD EXPIRATION DATE: NORMAL
DISPENSE STATUS: NORMAL
INR PPP: 1.1 (ref 0.9–1.1)
PRODUCT BLOOD TYPE: 8400
PRODUCT CODE: NORMAL
PROTHROMBIN TIME: 12.9 SECONDS (ref 9.8–12.8)
RH FACTOR (ANTIGEN D): NORMAL
RH FACTOR (ANTIGEN D): NORMAL
UNIT ABO: NORMAL
UNIT NUMBER: NORMAL
UNIT RH: NORMAL
UNIT VOLUME: 350
XM INTEP: NORMAL

## 2024-09-29 PROCEDURE — 86923 COMPATIBILITY TEST ELECTRIC: CPT

## 2024-09-29 PROCEDURE — 99231 SBSQ HOSP IP/OBS SF/LOW 25: CPT | Performed by: INTERNAL MEDICINE

## 2024-09-29 PROCEDURE — 1100000001 HC PRIVATE ROOM DAILY

## 2024-09-29 PROCEDURE — 2500000004 HC RX 250 GENERAL PHARMACY W/ HCPCS (ALT 636 FOR OP/ED): Performed by: INTERNAL MEDICINE

## 2024-09-29 PROCEDURE — 36415 COLL VENOUS BLD VENIPUNCTURE: CPT | Performed by: INTERNAL MEDICINE

## 2024-09-29 PROCEDURE — 86901 BLOOD TYPING SEROLOGIC RH(D): CPT | Performed by: STUDENT IN AN ORGANIZED HEALTH CARE EDUCATION/TRAINING PROGRAM

## 2024-09-29 PROCEDURE — 36415 COLL VENOUS BLD VENIPUNCTURE: CPT | Performed by: STUDENT IN AN ORGANIZED HEALTH CARE EDUCATION/TRAINING PROGRAM

## 2024-09-29 PROCEDURE — 85610 PROTHROMBIN TIME: CPT | Performed by: STUDENT IN AN ORGANIZED HEALTH CARE EDUCATION/TRAINING PROGRAM

## 2024-09-29 PROCEDURE — 2500000001 HC RX 250 WO HCPCS SELF ADMINISTERED DRUGS (ALT 637 FOR MEDICARE OP): Performed by: INTERNAL MEDICINE

## 2024-09-29 PROCEDURE — 86901 BLOOD TYPING SEROLOGIC RH(D): CPT | Performed by: INTERNAL MEDICINE

## 2024-09-29 ASSESSMENT — COGNITIVE AND FUNCTIONAL STATUS - GENERAL
MOBILITY SCORE: 24
MOBILITY SCORE: 24
DAILY ACTIVITIY SCORE: 24
MOBILITY SCORE: 24
DAILY ACTIVITIY SCORE: 24
MOBILITY SCORE: 24
DAILY ACTIVITIY SCORE: 24
MOBILITY SCORE: 24
DAILY ACTIVITIY SCORE: 24
MOBILITY SCORE: 24

## 2024-09-29 ASSESSMENT — PAIN SCALES - GENERAL
PAINLEVEL_OUTOF10: 0 - NO PAIN

## 2024-09-29 ASSESSMENT — PAIN - FUNCTIONAL ASSESSMENT
PAIN_FUNCTIONAL_ASSESSMENT: 0-10

## 2024-09-29 NOTE — PROGRESS NOTES
"Junior Fontanez is a 59 y.o. male on day 2 of admission presenting with Cervical myelopathy.    Subjective   Little less anxious today  Blood pressure is better       Objective     Physical Exam  Vitals reviewed.   Constitutional:       Appearance: Normal appearance.   HENT:      Head: Normocephalic and atraumatic.      Right Ear: Tympanic membrane, ear canal and external ear normal.      Left Ear: Tympanic membrane, ear canal and external ear normal.      Nose: Nose normal.      Mouth/Throat:      Pharynx: Oropharynx is clear.   Eyes:      Extraocular Movements: Extraocular movements intact.      Conjunctiva/sclera: Conjunctivae normal.      Pupils: Pupils are equal, round, and reactive to light.   Cardiovascular:      Rate and Rhythm: Normal rate and regular rhythm.      Pulses: Normal pulses.      Heart sounds: Normal heart sounds.   Pulmonary:      Effort: Pulmonary effort is normal.      Breath sounds: Normal breath sounds.   Abdominal:      General: Abdomen is flat. Bowel sounds are normal.      Palpations: Abdomen is soft.   Musculoskeletal:      Cervical back: Normal range of motion and neck supple.   Skin:     General: Skin is warm and dry.   Neurological:      General: No focal deficit present.      Mental Status: He is alert and oriented to person, place, and time.      Motor: Weakness present.      Coordination: Coordination abnormal.   Psychiatric:         Mood and Affect: Mood normal.         Last Recorded Vitals  Blood pressure 112/62, pulse 70, temperature 36.4 °C (97.5 °F), temperature source Temporal, resp. rate 14, height 1.778 m (5' 10\"), weight 83 kg (183 lb), SpO2 98%.  Intake/Output last 3 Shifts:  I/O last 3 completed shifts:  In: 3025 (36.4 mL/kg) [IV Piggyback:3025]  Out: - (0 mL/kg)   Weight: 83 kg     Relevant Results               Scheduled medications  docusate sodium, 100 mg, oral, BID  enoxaparin, 40 mg, subcutaneous, q24h  metoprolol succinate XL, 25 mg, oral, BID  pantoprazole, 40 " mg, oral, Daily before breakfast   Or  pantoprazole, 40 mg, intravenous, Daily before breakfast  polyethylene glycol, 17 g, oral, Daily      Continuous medications  potassium tvsuyws-U3-1.45%NaCl, 100 mL/hr, Last Rate: 100 mL/hr (09/28/24 1814)      PRN medications  PRN medications: acetaminophen **OR** acetaminophen **OR** acetaminophen, benzocaine-menthol, dextromethorphan-guaifenesin, guaiFENesin, melatonin, ondansetron **OR** ondansetron  Results for orders placed or performed during the hospital encounter of 09/26/24 (from the past 24 hour(s))   CBC   Result Value Ref Range    WBC 6.9 4.4 - 11.3 x10*3/uL    nRBC 0.0 0.0 - 0.0 /100 WBCs    RBC 4.88 4.50 - 5.90 x10*6/uL    Hemoglobin 14.2 13.5 - 17.5 g/dL    Hematocrit 42.3 41.0 - 52.0 %    MCV 87 80 - 100 fL    MCH 29.1 26.0 - 34.0 pg    MCHC 33.6 32.0 - 36.0 g/dL    RDW 12.0 11.5 - 14.5 %    Platelets 226 150 - 450 x10*3/uL   Basic Metabolic Panel   Result Value Ref Range    Glucose 113 (H) 74 - 99 mg/dL    Sodium 138 136 - 145 mmol/L    Potassium 4.4 3.5 - 5.3 mmol/L    Chloride 108 (H) 98 - 107 mmol/L    Bicarbonate 25 21 - 32 mmol/L    Anion Gap 9 (L) 10 - 20 mmol/L    Urea Nitrogen 11 6 - 23 mg/dL    Creatinine 0.87 0.50 - 1.30 mg/dL    eGFR >90 >60 mL/min/1.73m*2    Calcium 8.3 (L) 8.6 - 10.3 mg/dL     Transthoracic Echo (TTE) Complete    Result Date: 9/27/2024   [facility], [address] [place] [address]              Tel [phone] and Fax [phone] TRANSTHORACIC ECHOCARDIOGRAM REPORT  Patient Name:      NASIM Espinoza Physician:    [id number] Javan Miranda MD Study Date:        9/27/2024            Ordering Provider:    [id number] SHERRI BALDERAS MRN/PID:           [id number]             Fellow: Accession#:        [id number]         Nurse: Date of Birth/Age: 1965 / 59 years Sonographer:          Soledad Pak                                                                [facility] Gender:            M                    Additional Staff: Height:            177.00 cm            Admit Date:           9/26/2024 Weight:            83.00 kg             Admission Status:     Inpatient -                                                               Routine BSA / BMI:         2.00 m2 / 26.49      Encounter#:           [id number]                    kg/m2 Blood Pressure:    161/94 mmHg          Department Location:  [facility] Study Type:    TRANSTHORACIC ECHO (TTE) COMPLETE Diagnosis/ICD: Encounter for preprocedural cardiovascular examination-Z01.810 Indication:    Pre op CPT Code:      Echo Complete w Full Doppler-[id number] Patient History: Pertinent History: LBBB, RBBB. Study Detail: The following Echo studies were performed: 2D, M-Mode, Doppler and               color flow. Technically challenging study due to body habitus and               prominent lung artifact.  PHYSICIAN INTERPRETATION: Left Ventricle: Left ventricular ejection fraction is normal, calculated by Koch's biplane at 65%. Estimated left ventricular mass is normal. There are no regional left ventricular wall motion abnormalities. The left ventricular cavity size is normal. The left ventricular septal wall thickness is normal. There is normal left ventricular posterior wall thickness. Spectral Doppler shows a normal pattern of left ventricular diastolic filling. Left Atrium: The left atrium is normal in size. Right Ventricle: The right ventricle is normal in size. There is normal right ventricular global systolic function. Right Atrium: The right atrium is normal in size. Aortic Valve: The aortic valve is trileaflet. The aortic valve dimensionless index is 0.86. There is no evidence of aortic valve regurgitation. The peak instantaneous gradient of the aortic valve is 4.8 mmHg. The mean gradient of the aortic valve is 2.7 mmHg. Mitral Valve: The mitral valve is normal in structure. There is no evidence of mitral  valve regurgitation. Tricuspid Valve: The tricuspid valve is structurally normal. There is mild tricuspid regurgitation. Pulmonic Valve: The pulmonic valve is structurally normal. There is mild to moderate pulmonic valve regurgitation. Pericardium: There is no pericardial effusion noted. Aorta: The aortic root is normal. Systemic Veins: The inferior vena cava appears small in size. In comparison to the previous echocardiogram(s): Compared with study dated 8/11/2023, no significant change.  CONCLUSIONS:  1. Left ventricular ejection fraction is normal, calculated by Koch's biplane at 65%.  2. There is normal right ventricular global systolic function.  3. There is mild to moderate pulmonic valve regurgitation. QUANTITATIVE DATA SUMMARY:  2D MEASUREMENTS:          Normal Ranges: LAs:             3.09 cm  (2.7-4.0cm) IVSd:            0.78 cm  (0.6-1.1cm) LVPWd:           0.76 cm  (0.6-1.1cm) LVIDd:           4.60 cm  (3.9-5.9cm) LVIDs:           3.03 cm LV Mass Index:   56 g/m2 LVEDV Index:     39 ml/m2 LV % FS          34.1 %  LA VOLUME:                    Normal Ranges: LA Vol A4C:        35.0 ml    (22+/-6mL/m2) LA Vol A2C:        29.0 ml LA Vol BP:         33.7 ml LA Vol Index A4C:  17.5ml/m2 LA Vol Index A2C:  14.5 ml/m2 LA Vol Index BP:   16.8 ml/m2 LA Area A4C:       13.3 cm2 LA Area A2C:       12.8 cm2 LA Major Axis A4C: 4.3 cm LA Major Axis A2C: 4.8 cm LA Volume Index:   16.8 ml/m2 LA Vol A4C:        32.7 ml LA Vol A2C:        27.5 ml LA Vol Index BSA:  15.0 ml/m2  RA VOLUME BY A/L METHOD:          Normal Ranges: RA Area A4C:             12.2 cm2  AORTA MEASUREMENTS:         Normal Ranges: Ao Sinus, d:        3.20 cm (2.1-3.5cm) Ao STJ, d:          3.20 cm (1.7-3.4cm) Asc Ao, d:          3.20 cm (2.1-3.4cm)  LV SYSTOLIC FUNCTION BY 2D PLANIMETRY (MOD):                      Normal Ranges: EF-A4C View:    60 % (>=55%) EF-A2C View:    66 % EF-Biplane:     65 % LV EF Reported: 65 %  LV DIASTOLIC FUNCTION:              Normal Ranges: MV Peak E:             0.35 m/s    (0.7-1.2 m/s) MV Peak A:             0.55 m/s    (0.42-0.7 m/s) E/A Ratio:             0.63        (1.0-2.2) MV e'                  0.070 m/s   (>8.0) MV lateral e'          0.08 m/s MV medial e'           0.06 m/s MV A Dur:              131.49 msec E/e' Ratio:            4.94        (<8.0) PulmV Sys Michele:         37.33 cm/s PulmV Haynes Michele:        25.46 cm/s PulmV S/D Michele:         1.47 PulmV A Revs Michele:      20.38 cm/s PulmV A Revs Dur:      122.26 msec  MITRAL VALVE:          Normal Ranges: MV DT:        173 msec (150-240msec)  AORTIC VALVE:                     Normal Ranges: AoV Vmax:                1.09 m/s (<=1.7m/s) AoV Peak P.8 mmHg (<20mmHg) AoV Mean P.7 mmHg (1.7-11.5mmHg) LVOT Max Michele:            1.02 m/s (<=1.1m/s) AoV VTI:                 21.35 cm (18-25cm) LVOT VTI:                18.29 cm LVOT Diameter:           2.18 cm  (1.8-2.4cm) AoV Area, VTI:           3.20 cm2 (2.5-5.5cm2) AoV Area,Vmax:           3.49 cm2 (2.5-4.5cm2) AoV Dimensionless Index: 0.86  RIGHT VENTRICLE: RV Basal 3.60 cm RV Mid   2.00 cm RV Major 7.8 cm TAPSE:   26.0 mm  TRICUSPID VALVE/RVSP:          Normal Ranges: Peak TR Velocity:     2.16 m/s Est. RA Pressure:     3 mmHg RV Syst Pressure:     22 mmHg  (< 30mmHg) IVC Diam:             0.90 cm  PULMONIC VALVE:          Normal Ranges: PV Accel Time:  90 msec  (>120ms) PV Max Michele:     1.1 m/s  (0.6-0.9m/s) PV Max P.5 mmHg  Pulmonary Veins: PulmV A Revs Dur: 122.26 msec PulmV A Revs Michele: 20.38 cm/s PulmV Haynes Michele:   25.46 cm/s PulmV S/D Michele:    1.47 PulmV Sys Michele:    37.33 cm/s  [id number] Javan Miranda MD Electronically signed on 2024 at 4:51:16 PM  ** Final **                   Assessment/Plan   Assessment & Plan  Cervical myelopathy    Right bundle branch block (RBBB) with left anterior fascicular block (LAFB)    Primary hypertension    Anxiety    Blood pressure doing well on  metoprolol  Echo looks good  Cardiology cleared him for surgery  Plan for surgery on Monday       I spent  minutes in the professional and overall care of this patient.      Kika Osborn MD

## 2024-09-29 NOTE — PROGRESS NOTES
"Junior Fontanez is a 59 y.o. male on day 3 of admission presenting with Cervical myelopathy.    Subjective   No acute events overnight       Objective     Physical Exam  Aox3  Strength   Right deltoid: 5/5  Left deltoid: 5/5  Right biceps: 5/5  Left biceps: 5/5  Right triceps: 5/5  Left triceps: 5/5  Right interossei: 4/5  Left interossei: 4/5  Right quadriceps: 5/5  Left quadriceps: 5/5  Right anterior tibial: 5/5  Left anterior tibial: 5/5  Right gastroc: 5/5  Left gastroc: 5/5     R aguilar  BLE clonus, hypperreflexia   Last Recorded Vitals  Blood pressure 126/73, pulse 74, temperature 36.6 °C (97.9 °F), resp. rate 14, height 1.778 m (5' 10\"), weight 83 kg (183 lb), SpO2 98%.  Intake/Output last 3 Shifts:  I/O last 3 completed shifts:  In: 4525 (54.5 mL/kg) [P.O.:300; IV Piggyback:4225]  Out: - (0 mL/kg)   Weight: 83 kg     Relevant Results                              Assessment/Plan   Assessment & Plan  Cervical myelopathy    Right bundle branch block (RBBB) with left anterior fascicular block (LAFB)    Primary hypertension    Anxiety    Patient is a 59 yr old with h/o bundle branch block mitral valve regurgitation, p/w neck pain, LUE ext weakness, 9/10 MRI CS w/ severe C3-4 stenosis and cord signal change, 9/23 MRI LS chronic degen     ASSESSMENT  Patient with signs of progressive myelopathy on exam with severe C3-4 stenosis, cord signal change  Given his progression of symptoms, difficulty walking and using his hands, recommend surgical intervention to ARREST progression of symptoms    Plan:  -OR  Monday 9/30 for C3 laminectomy, C4-5 laminoplasty  -please preoperative labwork (CBC, T&S, coag, RFP) and make patient NPO at midnight Sun 9/29  -hold DVT ppx midnight 9/29  -appreciate cardiology recs-low and acceptable risk for surgery    Risks and benefits of surgery was discussed (including but not limited to infection, bleeding, damage to surrounding structures, C5 palsy, CSF leak) and patient is agreeable to " proceed.     Patient and imaging staffed with attending Dr. Tracey who agrees with the above plan           Manjula Chandler MD

## 2024-09-29 NOTE — PROGRESS NOTES
"Junior Fontaenz is a 59 y.o. male on day 3 of admission presenting with Cervical myelopathy.    Subjective   Little less anxious today  Blood pressure is better was elevated earlier this morning       Objective     Physical Exam  Vitals reviewed.   Constitutional:       Appearance: Normal appearance.   HENT:      Head: Normocephalic and atraumatic.      Right Ear: Tympanic membrane, ear canal and external ear normal.      Left Ear: Tympanic membrane, ear canal and external ear normal.      Nose: Nose normal.      Mouth/Throat:      Pharynx: Oropharynx is clear.   Eyes:      Extraocular Movements: Extraocular movements intact.      Conjunctiva/sclera: Conjunctivae normal.      Pupils: Pupils are equal, round, and reactive to light.   Cardiovascular:      Rate and Rhythm: Normal rate and regular rhythm.      Pulses: Normal pulses.      Heart sounds: Normal heart sounds.   Pulmonary:      Effort: Pulmonary effort is normal.      Breath sounds: Normal breath sounds.   Abdominal:      General: Abdomen is flat. Bowel sounds are normal.      Palpations: Abdomen is soft.   Musculoskeletal:      Cervical back: Normal range of motion and neck supple.   Skin:     General: Skin is warm and dry.   Neurological:      General: No focal deficit present.      Mental Status: He is alert and oriented to person, place, and time.      Motor: Weakness present.      Coordination: Coordination abnormal.   Psychiatric:         Mood and Affect: Mood normal.         Last Recorded Vitals  Blood pressure 133/81, pulse 72, temperature 36.4 °C (97.6 °F), temperature source Oral, resp. rate 16, height 1.778 m (5' 10\"), weight 83 kg (183 lb), SpO2 96%.  Intake/Output last 3 Shifts:  I/O last 3 completed shifts:  In: 4525 (54.5 mL/kg) [P.O.:300; IV Piggyback:4225]  Out: - (0 mL/kg)   Weight: 83 kg     Relevant Results               Scheduled medications  docusate sodium, 100 mg, oral, BID  enoxaparin, 40 mg, subcutaneous, q24h  metoprolol " succinate XL, 25 mg, oral, BID  pantoprazole, 40 mg, oral, Daily before breakfast   Or  pantoprazole, 40 mg, intravenous, Daily before breakfast  polyethylene glycol, 17 g, oral, Daily      Continuous medications  potassium xnhcifq-O6-9.45%NaCl, 100 mL/hr, Last Rate: 100 mL/hr (09/29/24 1422)      PRN medications  PRN medications: acetaminophen **OR** acetaminophen **OR** acetaminophen, benzocaine-menthol, dextromethorphan-guaifenesin, guaiFENesin, melatonin, ondansetron **OR** ondansetron  Results for orders placed or performed during the hospital encounter of 09/26/24 (from the past 24 hour(s))   Coagulation Screen   Result Value Ref Range    Protime 12.9 (H) 9.8 - 12.8 seconds    INR 1.1 0.9 - 1.1    aPTT 46 (H) 27 - 38 seconds   Type And Screen   Result Value Ref Range    ABO TYPE AB     Rh TYPE POS     ANTIBODY SCREEN NEG      No results found.                 Assessment/Plan   Assessment & Plan  Cervical myelopathy    Right bundle branch block (RBBB) with left anterior fascicular block (LAFB)    Primary hypertension    Anxiety    Blood pressure doing well on metoprolol  Echo looks good  Cardiology cleared him for surgery  Plan for surgery on Monday  Discussed with the family  All questions answered       I spent  minutes in the professional and overall care of this patient.      Kika Osborn MD

## 2024-09-29 NOTE — CARE PLAN
Problem: Pain - Adult  Goal: Verbalizes/displays adequate comfort level or baseline comfort level  Outcome: Progressing     Problem: Safety - Adult  Goal: Free from fall injury  Outcome: Progressing     Problem: Discharge Planning  Goal: Discharge to home or other facility with appropriate resources  Outcome: Progressing     Problem: Chronic Conditions and Co-morbidities  Goal: Patient's chronic conditions and co-morbidity symptoms are monitored and maintained or improved  Outcome: Progressing     Problem: Skin  Goal: Decreased wound size/increased tissue granulation at next dressing change  Outcome: Progressing  Goal: Participates in plan/prevention/treatment measures  Outcome: Progressing  Goal: Prevent/manage excess moisture  Outcome: Progressing  Goal: Prevent/minimize sheer/friction injuries  9/29/2024 1641 by Louann Aleman RN  Outcome: Progressing  9/29/2024 1641 by Louann Aleman RN  Flowsheets (Taken 9/29/2024 1641)  Prevent/minimize sheer/friction injuries: Turn/reposition every 2 hours/use positioning/transfer devices  Goal: Promote/optimize nutrition  Outcome: Progressing  Goal: Promote skin healing  Outcome: Progressing   The patient's goals for the shift include      The clinical goals for the shift include pt remian safe and HDS    Over the shift, the patient did not make progress toward the following goals. Barriers to progression include . Recommendations to address these barriers include .  To promote good nutrition

## 2024-09-29 NOTE — CARE PLAN
The patient's goals for the shift include  sleep comfortable    The clinical goals for the shift include to provide comfort by managing patient's pain this shift

## 2024-09-30 ENCOUNTER — ANESTHESIA (OUTPATIENT)
Dept: OPERATING ROOM | Facility: HOSPITAL | Age: 59
DRG: 519 | End: 2024-09-30
Payer: COMMERCIAL

## 2024-09-30 ENCOUNTER — ANESTHESIA EVENT (OUTPATIENT)
Dept: OPERATING ROOM | Facility: HOSPITAL | Age: 59
DRG: 519 | End: 2024-09-30
Payer: COMMERCIAL

## 2024-09-30 ENCOUNTER — APPOINTMENT (OUTPATIENT)
Dept: RADIOLOGY | Facility: HOSPITAL | Age: 59
DRG: 519 | End: 2024-09-30
Payer: COMMERCIAL

## 2024-09-30 PROCEDURE — 63001 REMOVE SPINE LAMINA 1/2 CRVL: CPT | Performed by: NEUROLOGICAL SURGERY

## 2024-09-30 PROCEDURE — 2500000005 HC RX 250 GENERAL PHARMACY W/O HCPCS: Performed by: ANESTHESIOLOGY

## 2024-09-30 PROCEDURE — 7100000001 HC RECOVERY ROOM TIME - INITIAL BASE CHARGE: Performed by: NEUROLOGICAL SURGERY

## 2024-09-30 PROCEDURE — 2500000004 HC RX 250 GENERAL PHARMACY W/ HCPCS (ALT 636 FOR OP/ED)

## 2024-09-30 PROCEDURE — 3600000017 HC OR TIME - EACH INCREMENTAL 1 MINUTE - PROCEDURE LEVEL SIX: Performed by: NEUROLOGICAL SURGERY

## 2024-09-30 PROCEDURE — 2500000004 HC RX 250 GENERAL PHARMACY W/ HCPCS (ALT 636 FOR OP/ED): Performed by: ANESTHESIOLOGY

## 2024-09-30 PROCEDURE — 3700000002 HC GENERAL ANESTHESIA TIME - EACH INCREMENTAL 1 MINUTE: Performed by: NEUROLOGICAL SURGERY

## 2024-09-30 PROCEDURE — 2500000001 HC RX 250 WO HCPCS SELF ADMINISTERED DRUGS (ALT 637 FOR MEDICARE OP): Performed by: STUDENT IN AN ORGANIZED HEALTH CARE EDUCATION/TRAINING PROGRAM

## 2024-09-30 PROCEDURE — 76000 FLUOROSCOPY <1 HR PHYS/QHP: CPT

## 2024-09-30 PROCEDURE — A63051 PR C-LAMINOPLASTY W/GRAFT/PLATE, 2 OR MORE

## 2024-09-30 PROCEDURE — 3700000001 HC GENERAL ANESTHESIA TIME - INITIAL BASE CHARGE: Performed by: NEUROLOGICAL SURGERY

## 2024-09-30 PROCEDURE — A63051 PR C-LAMINOPLASTY W/GRAFT/PLATE, 2 OR MORE: Performed by: ANESTHESIOLOGY

## 2024-09-30 PROCEDURE — 2500000004 HC RX 250 GENERAL PHARMACY W/ HCPCS (ALT 636 FOR OP/ED): Performed by: NEUROLOGICAL SURGERY

## 2024-09-30 PROCEDURE — 63051 C-LAMINOPLASTY W/GRAFT/PLATE: CPT | Performed by: NEUROLOGICAL SURGERY

## 2024-09-30 PROCEDURE — 1100000001 HC PRIVATE ROOM DAILY

## 2024-09-30 PROCEDURE — 3600000018 HC OR TIME - INITIAL BASE CHARGE - PROCEDURE LEVEL SIX: Performed by: NEUROLOGICAL SURGERY

## 2024-09-30 PROCEDURE — 99232 SBSQ HOSP IP/OBS MODERATE 35: CPT | Performed by: INTERNAL MEDICINE

## 2024-09-30 PROCEDURE — 7100000002 HC RECOVERY ROOM TIME - EACH INCREMENTAL 1 MINUTE: Performed by: NEUROLOGICAL SURGERY

## 2024-09-30 PROCEDURE — 2780000003 HC OR 278 NO HCPCS: Performed by: NEUROLOGICAL SURGERY

## 2024-09-30 PROCEDURE — C1713 ANCHOR/SCREW BN/BN,TIS/BN: HCPCS | Performed by: NEUROLOGICAL SURGERY

## 2024-09-30 PROCEDURE — 2500000004 HC RX 250 GENERAL PHARMACY W/ HCPCS (ALT 636 FOR OP/ED): Performed by: INTERNAL MEDICINE

## 2024-09-30 PROCEDURE — 0PH304Z INSERTION OF INTERNAL FIXATION DEVICE INTO CERVICAL VERTEBRA, OPEN APPROACH: ICD-10-PCS | Performed by: NEUROLOGICAL SURGERY

## 2024-09-30 PROCEDURE — 2500000005 HC RX 250 GENERAL PHARMACY W/O HCPCS

## 2024-09-30 PROCEDURE — 2720000007 HC OR 272 NO HCPCS: Performed by: NEUROLOGICAL SURGERY

## 2024-09-30 PROCEDURE — 00NW0ZZ RELEASE CERVICAL SPINAL CORD, OPEN APPROACH: ICD-10-PCS | Performed by: NEUROLOGICAL SURGERY

## 2024-09-30 PROCEDURE — 2500000005 HC RX 250 GENERAL PHARMACY W/O HCPCS: Performed by: NEUROLOGICAL SURGERY

## 2024-09-30 DEVICE — IMPLANTABLE DEVICE: Type: IMPLANTABLE DEVICE | Site: NECK | Status: FUNCTIONAL

## 2024-09-30 DEVICE — SCREW, LEVERAGE 2.6 X 5MM, LAMINER: Type: IMPLANTABLE DEVICE | Site: NECK | Status: FUNCTIONAL

## 2024-09-30 DEVICE — SCREW, LEVERAGE, LATERAL MASS, 2.6 X 7MM: Type: IMPLANTABLE DEVICE | Site: NECK | Status: FUNCTIONAL

## 2024-09-30 DEVICE — PLATE, LEVERAGE, 8MM GRAFT C-C: Type: IMPLANTABLE DEVICE | Site: NECK | Status: FUNCTIONAL

## 2024-09-30 RX ORDER — METHOCARBAMOL 100 MG/ML
INJECTION, SOLUTION INTRAMUSCULAR; INTRAVENOUS AS NEEDED
Status: DISCONTINUED | OUTPATIENT
Start: 2024-09-30 | End: 2024-09-30

## 2024-09-30 RX ORDER — LABETALOL HYDROCHLORIDE 5 MG/ML
5 INJECTION, SOLUTION INTRAVENOUS ONCE AS NEEDED
Status: DISCONTINUED | OUTPATIENT
Start: 2024-09-30 | End: 2024-09-30 | Stop reason: HOSPADM

## 2024-09-30 RX ORDER — FENTANYL CITRATE 50 UG/ML
INJECTION, SOLUTION INTRAMUSCULAR; INTRAVENOUS AS NEEDED
Status: DISCONTINUED | OUTPATIENT
Start: 2024-09-30 | End: 2024-09-30

## 2024-09-30 RX ORDER — CYCLOBENZAPRINE HCL 5 MG
10 TABLET ORAL 3 TIMES DAILY
Status: DISCONTINUED | OUTPATIENT
Start: 2024-09-30 | End: 2024-10-01 | Stop reason: HOSPADM

## 2024-09-30 RX ORDER — LIDOCAINE HYDROCHLORIDE 10 MG/ML
0.1 INJECTION, SOLUTION EPIDURAL; INFILTRATION; INTRACAUDAL; PERINEURAL ONCE
Status: DISCONTINUED | OUTPATIENT
Start: 2024-09-30 | End: 2024-09-30 | Stop reason: HOSPADM

## 2024-09-30 RX ORDER — HYDROMORPHONE HYDROCHLORIDE 0.2 MG/ML
0.2 INJECTION INTRAMUSCULAR; INTRAVENOUS; SUBCUTANEOUS EVERY 4 HOURS PRN
Status: DISCONTINUED | OUTPATIENT
Start: 2024-09-30 | End: 2024-10-01 | Stop reason: HOSPADM

## 2024-09-30 RX ORDER — MIDAZOLAM HYDROCHLORIDE 1 MG/ML
INJECTION INTRAMUSCULAR; INTRAVENOUS AS NEEDED
Status: DISCONTINUED | OUTPATIENT
Start: 2024-09-30 | End: 2024-09-30

## 2024-09-30 RX ORDER — ONDANSETRON HYDROCHLORIDE 2 MG/ML
INJECTION, SOLUTION INTRAVENOUS AS NEEDED
Status: DISCONTINUED | OUTPATIENT
Start: 2024-09-30 | End: 2024-09-30

## 2024-09-30 RX ORDER — ACETAMINOPHEN 160 MG/5ML
650 SOLUTION ORAL EVERY 6 HOURS
Status: DISCONTINUED | OUTPATIENT
Start: 2024-09-30 | End: 2024-10-01 | Stop reason: HOSPADM

## 2024-09-30 RX ORDER — SODIUM CHLORIDE, SODIUM LACTATE, POTASSIUM CHLORIDE, CALCIUM CHLORIDE 600; 310; 30; 20 MG/100ML; MG/100ML; MG/100ML; MG/100ML
100 INJECTION, SOLUTION INTRAVENOUS CONTINUOUS
Status: DISCONTINUED | OUTPATIENT
Start: 2024-09-30 | End: 2024-10-01 | Stop reason: HOSPADM

## 2024-09-30 RX ORDER — KETOROLAC TROMETHAMINE 30 MG/ML
INJECTION, SOLUTION INTRAMUSCULAR; INTRAVENOUS AS NEEDED
Status: DISCONTINUED | OUTPATIENT
Start: 2024-09-30 | End: 2024-09-30

## 2024-09-30 RX ORDER — ROCURONIUM BROMIDE 10 MG/ML
INJECTION, SOLUTION INTRAVENOUS AS NEEDED
Status: DISCONTINUED | OUTPATIENT
Start: 2024-09-30 | End: 2024-09-30

## 2024-09-30 RX ORDER — HYDRALAZINE HYDROCHLORIDE 20 MG/ML
5 INJECTION INTRAMUSCULAR; INTRAVENOUS EVERY 30 MIN PRN
Status: DISCONTINUED | OUTPATIENT
Start: 2024-09-30 | End: 2024-09-30 | Stop reason: HOSPADM

## 2024-09-30 RX ORDER — ACETAMINOPHEN 650 MG/1
650 SUPPOSITORY RECTAL EVERY 6 HOURS
Status: DISCONTINUED | OUTPATIENT
Start: 2024-09-30 | End: 2024-10-01 | Stop reason: HOSPADM

## 2024-09-30 RX ORDER — ACETAMINOPHEN 325 MG/1
650 TABLET ORAL EVERY 6 HOURS
Status: DISCONTINUED | OUTPATIENT
Start: 2024-09-30 | End: 2024-10-01 | Stop reason: HOSPADM

## 2024-09-30 RX ORDER — LIDOCAINE HYDROCHLORIDE 20 MG/ML
INJECTION, SOLUTION EPIDURAL; INFILTRATION; INTRACAUDAL; PERINEURAL AS NEEDED
Status: DISCONTINUED | OUTPATIENT
Start: 2024-09-30 | End: 2024-09-30

## 2024-09-30 RX ORDER — PROPOFOL 10 MG/ML
INJECTION, EMULSION INTRAVENOUS AS NEEDED
Status: DISCONTINUED | OUTPATIENT
Start: 2024-09-30 | End: 2024-09-30

## 2024-09-30 RX ORDER — OXYCODONE HYDROCHLORIDE 5 MG/1
10 TABLET ORAL EVERY 4 HOURS PRN
Status: DISCONTINUED | OUTPATIENT
Start: 2024-09-30 | End: 2024-10-01 | Stop reason: HOSPADM

## 2024-09-30 RX ORDER — CEFAZOLIN 1 G/1
INJECTION, POWDER, FOR SOLUTION INTRAVENOUS AS NEEDED
Status: DISCONTINUED | OUTPATIENT
Start: 2024-09-30 | End: 2024-09-30

## 2024-09-30 RX ORDER — OXYCODONE HYDROCHLORIDE 5 MG/1
5 TABLET ORAL EVERY 4 HOURS PRN
Status: DISCONTINUED | OUTPATIENT
Start: 2024-09-30 | End: 2024-09-30 | Stop reason: HOSPADM

## 2024-09-30 RX ORDER — ALBUTEROL SULFATE 0.83 MG/ML
2.5 SOLUTION RESPIRATORY (INHALATION) ONCE AS NEEDED
Status: DISCONTINUED | OUTPATIENT
Start: 2024-09-30 | End: 2024-09-30 | Stop reason: HOSPADM

## 2024-09-30 RX ORDER — OXYCODONE HYDROCHLORIDE 5 MG/1
5 TABLET ORAL EVERY 4 HOURS PRN
Status: DISCONTINUED | OUTPATIENT
Start: 2024-09-30 | End: 2024-10-01 | Stop reason: HOSPADM

## 2024-09-30 RX ORDER — VANCOMYCIN HYDROCHLORIDE 1 G/20ML
INJECTION, POWDER, LYOPHILIZED, FOR SOLUTION INTRAVENOUS AS NEEDED
Status: DISCONTINUED | OUTPATIENT
Start: 2024-09-30 | End: 2024-09-30 | Stop reason: HOSPADM

## 2024-09-30 RX ORDER — ONDANSETRON HYDROCHLORIDE 2 MG/ML
4 INJECTION, SOLUTION INTRAVENOUS ONCE AS NEEDED
Status: DISCONTINUED | OUTPATIENT
Start: 2024-09-30 | End: 2024-09-30 | Stop reason: HOSPADM

## 2024-09-30 RX ORDER — SODIUM CHLORIDE, SODIUM LACTATE, POTASSIUM CHLORIDE, CALCIUM CHLORIDE 600; 310; 30; 20 MG/100ML; MG/100ML; MG/100ML; MG/100ML
INJECTION, SOLUTION INTRAVENOUS CONTINUOUS PRN
Status: DISCONTINUED | OUTPATIENT
Start: 2024-09-30 | End: 2024-09-30

## 2024-09-30 RX ORDER — ENOXAPARIN SODIUM 100 MG/ML
40 INJECTION SUBCUTANEOUS EVERY 24 HOURS
Status: DISCONTINUED | OUTPATIENT
Start: 2024-10-01 | End: 2024-10-01 | Stop reason: HOSPADM

## 2024-09-30 ASSESSMENT — PAIN DESCRIPTION - ORIENTATION
ORIENTATION: POSTERIOR
ORIENTATION: POSTERIOR

## 2024-09-30 ASSESSMENT — COGNITIVE AND FUNCTIONAL STATUS - GENERAL
MOBILITY SCORE: 24
DAILY ACTIVITIY SCORE: 24

## 2024-09-30 ASSESSMENT — PAIN SCALES - GENERAL
PAINLEVEL_OUTOF10: 2
PAINLEVEL_OUTOF10: 4
PAINLEVEL_OUTOF10: 3
PAINLEVEL_OUTOF10: 2
PAINLEVEL_OUTOF10: 8
PAINLEVEL_OUTOF10: 8
PAINLEVEL_OUTOF10: 7
PAINLEVEL_OUTOF10: 8

## 2024-09-30 ASSESSMENT — COLUMBIA-SUICIDE SEVERITY RATING SCALE - C-SSRS
6. HAVE YOU EVER DONE ANYTHING, STARTED TO DO ANYTHING, OR PREPARED TO DO ANYTHING TO END YOUR LIFE?: NO
2. HAVE YOU ACTUALLY HAD ANY THOUGHTS OF KILLING YOURSELF?: NO
1. IN THE PAST MONTH, HAVE YOU WISHED YOU WERE DEAD OR WISHED YOU COULD GO TO SLEEP AND NOT WAKE UP?: NO

## 2024-09-30 ASSESSMENT — PAIN DESCRIPTION - DESCRIPTORS: DESCRIPTORS: SORE

## 2024-09-30 ASSESSMENT — PAIN - FUNCTIONAL ASSESSMENT
PAIN_FUNCTIONAL_ASSESSMENT: 0-10
PAIN_FUNCTIONAL_ASSESSMENT: 0-10

## 2024-09-30 ASSESSMENT — PAIN DESCRIPTION - LOCATION
LOCATION: NECK
LOCATION: NECK

## 2024-09-30 NOTE — OP NOTE
Cervical Laminoplasty, Spine Decompression Operative Note     Date: 2024  OR Location: [facility] OR    Name: Junior Fontanez, : 1965, Age: 59 y.o., MRN: [id number], Sex: male    Diagnosis  Pre-op Diagnosis      * Cervical disc disorder with myelopathy of mid-cervical region [M50.020] Post-op Diagnosis     * Cervical disc disorder with myelopathy of mid-cervical region [M50.020]     Procedures  Cervical Laminoplasty  19355 - [place] LAMOPLASTY CERVICAL DCMPRN CORD 2/> SEG RCNSTJ    Spine Decompression  09478 - [place] CHAIDEZ W/O FACETEC FORAMOT/DSC 1/2 VRT SGM CRV      Surgeons      * Ishmael Tracey - Primary    Resident/Fellow/Other Assistant:  Surgeons and Role:     * Rui Butler MD - Resident - Assisting    OPERATION/PROCEDURE:    C3 laminectomy for Decompression of the spinal cord  C4 through C5 laminoplasty with osteoplastic reconstruction of  the posterior elements using laminoplasty plates.     MEDICATIONS:    Antibiotic prophylaxis given within one hour prior to skin incision.    PROPHYLAXIS:    Venous thromboembolism prophylaxis was ordered at the time of surgery in the form of mechanical prophylaxis using sequential compression devices.    INDICATION:  Mr. Fontanez is a 59 y.o. male  who presented with cervical stenosis and good correlation between the imaging studies and clinical signs and symptoms. In view of the presence of cervical stenosis with signs and symptoms of cervical myelopathy surgical treatment was discussed with the goals, risk and benefits of surgery and the fact that surgery may or may not alleviate the symptoms completely with small chances of even worsening of the symptoms. The option of non-operative treatment was also discussed. The patient chose to proceed with surgery after clear understanding of all the risk and benefits and goals of surgery.    TECHNIQUE:    After induction of general anesthesia, Schwab wells tongs were placed over patients skull. The patient was then  carefully turned prone on a Nura table, tongs were hooked up to in-line traction of 15 pounds, and all dependent portions of the patient's body were carefully padded. The posterior neck was clipped of hair and then scrubbed, prepped, and draped in the usual sterile fashion.  A Midline incision was carried out from C3 to C6 after injecting local anesthesia. Subcutaneous tissue divided and subperiosteal dissection was carried out using monopolar cautery over the spinous processes around the lamina and lateral masses of C3 through C5. Intraoperative levels were confirmed using fluoroscopy.  C3 laminectomy with resection of ligamentum flavum  was then performed for spinal cord decompression using high speed drill and Kerrison rongeurs.  Troughs performed at the laminofacet junctions of C4 through C5 with a full-thickness trough  on the left and partial-thickness on the right. Ligamentum flavum was dissected on the left and the open-door laminoplasty was carried out by elevating the lamina hinging it on the right. Each lamina was secured open using the laminoplasty plates from the NUVASIVE Leverage laminoplasty system with screws in the lateral masses and lamina.  Wound was irrigated and hemostasis achieved. A gram each of Vancomycin and ancef powder was scattered in the wound.  The wound was closed in multiple layers in a standard fashion and glue was used for the skin. The drapes were removed. Sterile dressing applied. The patient was turned back supine, awakened from anesthesia and taken to the recovery room. Please note, I was scrubbed and present for all of the surgery. There were no complications. The EBL was about 20 ml      Ishmael Tracey  Phone Number: [phone]

## 2024-09-30 NOTE — ANESTHESIA PROCEDURE NOTES
Airway  Date/Time: 9/30/2024 10:49 AM  Urgency: elective    Airway not difficult    Staffing  Performed: HERBERT   Authorized by: Darius Stewart MD    Performed by: HERBERT Clay  Patient location during procedure: OR    Indications and Patient Condition  Indications for airway management: anesthesia  Spontaneous Ventilation: absent  Sedation level: deep  Preoxygenated: yes  Patient position: sniffing  Mask difficulty assessment: 1 - vent by mask  No planned trial extubation    Final Airway Details  Final airway type: endotracheal airway      Successful airway: ETT  Cuffed: yes   Successful intubation technique: direct laryngoscopy (Glidescope)  Facilitating devices/methods: intubating stylet  Endotracheal tube insertion site: oral  Blade size: #4  ETT size (mm): 7.5  Cormack-Lehane Classification: grade I - full view of glottis  Placement verified by: chest auscultation and capnometry   Cuff volume (mL): 7  Measured from: lips  ETT to lips (cm): 22  Number of attempts at approach: 1

## 2024-09-30 NOTE — ANESTHESIA PREPROCEDURE EVALUATION
Patient: Junior Fontanez    Procedure Information       Date/Time: 09/30/24 1030    Procedures:       Cervical Laminoplasty (Neck)      Spine Decompression    Location: [facility] 04 / [facility] OR    Surgeons: Ishmael Tracey MD            Relevant Problems   Cardiac   (+) Primary hypertension   (+) Right bundle branch block (RBBB) with left anterior fascicular block (LAFB)      Neuro   (+) Anxiety      /Renal   (+) Kidney stone      Nervous   (+) Cervical myelopathy       Clinical information reviewed:   Tobacco  Allergies  Meds   Med Hx  Surg Hx   Fam Hx  Soc Hx        NPO Detail:  No data recorded     Physical Exam    Airway  Mallampati: II     Cardiovascular   Rhythm: regular  Rate: normal     Dental    Pulmonary    Abdominal            Anesthesia Plan    History of general anesthesia?: yes  History of complications of general anesthesia?: no    ASA 3     general     intravenous induction   Anesthetic plan and risks discussed with patient.    Plan discussed with CAA.

## 2024-09-30 NOTE — ANESTHESIA POSTPROCEDURE EVALUATION
Patient: Junior Fontanez    Procedure Summary       Date: 09/30/24 Room / Location: [facility] OR 04 / [facility]    Anesthesia Start: 1043 Anesthesia Stop: 1251    Procedures:       Cervical Laminoplasty (Neck)      Spine Decompression Diagnosis:       Cervical disc disorder with myelopathy of mid-cervical region      (Cervical disc disorder with myelopathy of mid-cervical region [M50.020])    Surgeons: Ishmael Tracey MD Responsible Provider: Edwar Narayanan MD    Anesthesia Type: general ASA Status: 3            Anesthesia Type: general    Vitals Value Taken Time   /83 09/30/24 1400   Temp 36.2 °C (97.2 °F) 09/30/24 1400   Pulse 69 09/30/24 1400   Resp 14 09/30/24 1400   SpO2 96 % 09/30/24 1400       Anesthesia Post Evaluation    Patient participation: complete - patient participated  Level of consciousness: awake  Pain management: satisfactory to patient  Airway patency: patent  Cardiovascular status: acceptable and hemodynamically stable  Respiratory status: acceptable and nonlabored ventilation  Hydration status: balanced  Postoperative Nausea and Vomiting: none        No notable events documented.

## 2024-09-30 NOTE — PROGRESS NOTES
"Subjective   Patient ID: Junior Fontanez is a 59 y.o. male who presents for Follow-up (results).    HPI   Patient is here for MRI of the C-spine and lumbosacral spine  His symptoms of right arm weakness and numbness and right leg weakness is progressively getting worse    Patient is here for follow-up on x-ray of C-spine and lumbar spine  Follow-up on EMG test  Patient is getting worsening of the numbness and weakness of the right arm and the right leg.  It started few months ago but progressively is getting worse.  He is limping a little.  The numbness shoots down the right arm.  He denies any trauma to the spine    Past recap  Patient is here for follow-up on blood work  Feels very tired  Per wife he has OCD and anxiety  Having a lot of pain in the neck and the back  Gets numbness in the arm and the foot     patient is here for physical  Complaining of numbness in the right arm and the right hand and complaining of numbness in the right foot sometimes dragging  Colonoscopy due next year    Patient is here for follow-up on 2D echo follow-up on blood work   Follow-up on lung CT  He has quit smoking for now 34 days  Complains of feeling fatigued gets lack of energy shortness of breath numbness and tingling in the hands and arms      patient here for physical     PMH : tonsillectomy, left bundle branch block, scoliosis  SH: smoker , no etoh  FH : M CANCER , F NHL , GF DM   Review of Systems    Objective   /68   Ht 1.778 m (5' 10\")   Wt 83 kg (183 lb)   BMI 26.26 kg/m²     Physical Exam  Vitals reviewed.   Constitutional:       Appearance: Normal appearance.   HENT:      Head: Normocephalic and atraumatic.      Right Ear: Tympanic membrane, ear canal and external ear normal.      Left Ear: Tympanic membrane, ear canal and external ear normal.      Nose: Nose normal.      Mouth/Throat:      Pharynx: Oropharynx is clear.   Eyes:      Extraocular Movements: Extraocular movements intact.      Conjunctiva/sclera: " Conjunctivae normal.      Pupils: Pupils are equal, round, and reactive to light.   Cardiovascular:      Rate and Rhythm: Normal rate and regular rhythm.      Pulses: Normal pulses.      Heart sounds: Normal heart sounds.   Pulmonary:      Effort: Pulmonary effort is normal.      Breath sounds: Normal breath sounds.   Abdominal:      General: Abdomen is flat. Bowel sounds are normal.      Palpations: Abdomen is soft.   Musculoskeletal:      Cervical back: Normal range of motion and neck supple.      Comments: Scoliosis   Skin:     General: Skin is warm and dry.   Neurological:      General: No focal deficit present.      Mental Status: He is alert and oriented to person, place, and time.      Deep Tendon Reflexes: Reflexes abnormal.      Comments: Reflexes hyper   Psychiatric:         Mood and Affect: Mood normal.         Assessment/Plan   Problem List Items Addressed This Visit    None  Visit Diagnoses       Cervical spinal cord compression (Multi)        Relevant Orders    Initiate Request to another  Facility or Exempt Unit ([facility], -Owned Rehab, Hospice) (Completed)            Past recap   physical normal  Blood work ordered  Tdap not available follow-up to get it  Scheduled for an eye examination next week  Had colonoscopy at 49  Discussed diet exercise and healthy lifestyle     7/17/2023  Physical normal   EKG done shows right bundle branch block  Discussed with the patient that he needs to rule out sleep apnea needs rule out lung issues  We will do CT cardiac scoring  We will do CT lung cancer screening as he had lung nodules in the past  Check 2D echo  Routine blood work ordered  Encouraged to quit smoking  Follow-up after the test    8/26/2023  2D echo shows moderate pulmonary regurg slightly reduced ejection fraction  That could explain right bundle branch block  Advised patient to go for sleep study  CAT scan shows emphysema and upper lung fields congratulated for quitting smoking  We  will do nuclear pharmacological stress test  Repeat blood pressure 120/72  Will refer to cardiology for further evaluation    11/18/2023  Blood work reviewed  Stress test normal  Mild obstructive sleep apnea  Vitamin D low at 17  Vitamin D supplement started  Mild upper lobe emphysema  Patient has quit smoking,  Follow-up for routine physical next year    7/20/2024  Physical normal  Routine blood work ordered CBC be fasting with TSH vitamin D was low before  Check PSA  Patient's symptoms of right arm numbness could be related to scoliosis  Will do EMG right arm and right leg to evaluate further  Follow-up after blood work and EMG    7/27/2024  Blood work reviewed  All blood work in normal range  No electrolyte deficiency to explain numbness or tingling in the arms and legs  Vitamin D deficiency  Vitamin D 50,000 q. weekly for 3 months then 2000 a day  X-ray C-spine thoracic spine lumbar spine  EMG  Follow-up after the test    8/24/2024  C-spine and lumbosacral spine shows advanced discogenic degenerative changes  Patient's neurological examination is worse than last time  Will get MRI of C-spine and lumbosacral spine in view of abnormal x-rays  Hyperreflexic reflexes  EMG also shows possibility of radiculopathy in the cervical area and lumbar area  Follow-up after the MRI    9/26/2024  MRI of C-spine and MRI of lumbosacral spine reviewed  EMG results reviewed  Patient has cervical spinal cord compression stenosis   Call the on-call neurosurgery team  She recommended patient to be transferred to [facility] to be able to operate on Monday  Patient refused  Will refer patient to allergy ER for evaluation of neurosurgery to see if patient needs to be inpatient or can be treated outpatient

## 2024-09-30 NOTE — PROGRESS NOTES
"Junior Fontanez is a 59 y.o. male on day 4 of admission presenting with Cervical myelopathy.    Subjective   No acute events overnight       Objective     Physical Exam  Aox3  Strength   Right deltoid: 5/5  Left deltoid: 5/5  Right biceps: 5/5  Left biceps: 5/5  Right triceps: 5/5  Left triceps: 5/5  Right interossei: 4/5  Left interossei: 4/5  Right quadriceps: 5/5  Left quadriceps: 5/5  Right anterior tibial: 5/5  Left anterior tibial: 5/5  Right gastroc: 5/5  Left gastroc: 5/5     R aguilar  BLE clonus, hypperreflexia   Last Recorded Vitals  Blood pressure (!) 141/94, pulse 82, temperature 36.9 °C (98.4 °F), resp. rate 16, height 1.778 m (5' 10\"), weight 83 kg (183 lb), SpO2 94%.  Intake/Output last 3 Shifts:  I/O last 3 completed shifts:  In: 2700 (32.5 mL/kg) [P.O.:300; IV Piggyback:2400]  Out: - (0 mL/kg)   Weight: 83 kg     Relevant Results                              Assessment/Plan   Assessment & Plan  Cervical myelopathy    Right bundle branch block (RBBB) with left anterior fascicular block (LAFB)    Primary hypertension    Anxiety    Patient is a 59 yr old with h/o bundle branch block mitral valve regurgitation, p/w neck pain, LUE ext weakness, 9/10 MRI CS w/ severe C3-4 stenosis and cord signal change, 9/23 MRI LS chronic degen     ASSESSMENT  Patient with signs of progressive myelopathy on exam with severe C3-4 stenosis, cord signal change  Given his progression of symptoms, difficulty walking and using his hands, recommend surgical intervention to ARREST progression of symptoms    Plan:  -OR today for C4-5 laminoplasty  -after OR, rec scheduled tylenol, oxy 5 for moderate pain, oxy 10 for severe pain, dilaudid IV 0.2 for breakthrough with scheduled flexeril TID  -OK to ADAT post op    Risks and benefits of surgery was discussed (including but not limited to infection, bleeding, damage to surrounding structures, C5 palsy, CSF leak) and patient is agreeable to proceed.     Patient and imaging staffed " with attending Dr. Tracey who agrees with the above plan           Rui Butler MD

## 2024-09-30 NOTE — CARE PLAN
The patient's goals for the shift include  sleep comfortable    The clinical goals for the shift include to promote a safe environment and patient will remain free from falls.

## 2024-09-30 NOTE — PROGRESS NOTES
09/30/24 0839   Discharge Planning   Expected Discharge Disposition Home     Admitted with cervical myelopathy. Plan for OR today for C3 laminectomy, C4-5 laminoplasty.    TCC to follow for dc needs after surgery.

## 2024-09-30 NOTE — BRIEF OP NOTE
Date: 2024 - 2024  OR Location: [facility] OR    Name: Junior Fontanez, : 1965, Age: 59 y.o., MRN: [id number], Sex: male    Diagnosis  Pre-op Diagnosis      * Cervical disc disorder with myelopathy of mid-cervical region [M50.020] Post-op Diagnosis     * Cervical disc disorder with myelopathy of mid-cervical region [M50.020]     Procedures  Cervical Laminoplasty  78050 - [place] LAMOPLASTY CERVICAL DCMPRN CORD 2/> SEG RCNSTJ    Spine Decompression  08103 - [place] CHAIDEZ W/O FACETEC FORAMOT/DSC 1/2 VRT SGM CRV      Surgeons      * Ishmael Tracey - Primary    Resident/Fellow/Other Assistant:  Surgeons and Role:     * Rui Butler MD - Resident - Assisting    Procedure Summary  Anesthesia: General  ASA: III  Anesthesia Staff: Anesthesiologist: Darius Stewart MD; Edwar Narayanan MD  C-AA: HERBERT Clay  Estimated Blood Loss: 20 mL  Intra-op Medications:   Administrations occurring from 1030 to 1300 on 24:   Medication Name Total Dose   lidocaine-epinephrine PF (Xylocaine W/EPI) 1 %-1:200,000 injection 10 mL   vancomycin (Vancocin) vial for injection 1 g   gelatin absorbable (Gelfoam) 100 sponge 1 each   Unable to Find 1,000 mg              Anesthesia Record               Intraprocedure I/O Totals          Output    Est. Blood Loss 20 mL    Total Output 20 mL          Specimen: No specimens collected     Staff:   Circulator: Jacquelyn  Scrub Person: Lolis Isaac Circulator: Jerson Isaac Scrub: Tahira          Findings: good decompression of thecal sac    Complications:  None; patient tolerated the procedure well.     Disposition: PACU - hemodynamically stable.  Condition: stable  Specimens Collected: No specimens collected  Attending Attestation:     Ishmael Tracey  Phone Number: [phone]

## 2024-10-01 VITALS
BODY MASS INDEX: 26.2 KG/M2 | OXYGEN SATURATION: 94 % | HEART RATE: 82 BPM | WEIGHT: 183 LBS | HEIGHT: 70 IN | DIASTOLIC BLOOD PRESSURE: 85 MMHG | RESPIRATION RATE: 18 BRPM | SYSTOLIC BLOOD PRESSURE: 128 MMHG | TEMPERATURE: 98.6 F

## 2024-10-01 LAB
ANION GAP SERPL CALC-SCNC: 11 MMOL/L (ref 10–20)
BUN SERPL-MCNC: 14 MG/DL (ref 6–23)
CALCIUM SERPL-MCNC: 8.4 MG/DL (ref 8.6–10.3)
CHLORIDE SERPL-SCNC: 105 MMOL/L (ref 98–107)
CO2 SERPL-SCNC: 27 MMOL/L (ref 21–32)
CREAT SERPL-MCNC: 0.85 MG/DL (ref 0.5–1.3)
EGFRCR SERPLBLD CKD-EPI 2021: >90 ML/MIN/1.73M*2
ERYTHROCYTE [DISTWIDTH] IN BLOOD BY AUTOMATED COUNT: 12.2 % (ref 11.5–14.5)
GLUCOSE SERPL-MCNC: 105 MG/DL (ref 74–99)
HCT VFR BLD AUTO: 39.7 % (ref 41–52)
HGB BLD-MCNC: 13.3 G/DL (ref 13.5–17.5)
MCH RBC QN AUTO: 28.4 PG (ref 26–34)
MCHC RBC AUTO-ENTMCNC: 33.5 G/DL (ref 32–36)
MCV RBC AUTO: 85 FL (ref 80–100)
NRBC BLD-RTO: 0 /100 WBCS (ref 0–0)
PLATELET # BLD AUTO: 225 X10*3/UL (ref 150–450)
POTASSIUM SERPL-SCNC: 4 MMOL/L (ref 3.5–5.3)
RBC # BLD AUTO: 4.69 X10*6/UL (ref 4.5–5.9)
SODIUM SERPL-SCNC: 139 MMOL/L (ref 136–145)
WBC # BLD AUTO: 14.7 X10*3/UL (ref 4.4–11.3)

## 2024-10-01 PROCEDURE — 36415 COLL VENOUS BLD VENIPUNCTURE: CPT | Performed by: INTERNAL MEDICINE

## 2024-10-01 PROCEDURE — 80048 BASIC METABOLIC PNL TOTAL CA: CPT | Performed by: INTERNAL MEDICINE

## 2024-10-01 PROCEDURE — 2500000001 HC RX 250 WO HCPCS SELF ADMINISTERED DRUGS (ALT 637 FOR MEDICARE OP): Performed by: STUDENT IN AN ORGANIZED HEALTH CARE EDUCATION/TRAINING PROGRAM

## 2024-10-01 PROCEDURE — 99239 HOSP IP/OBS DSCHRG MGMT >30: CPT | Performed by: INTERNAL MEDICINE

## 2024-10-01 PROCEDURE — 85027 COMPLETE CBC AUTOMATED: CPT | Performed by: INTERNAL MEDICINE

## 2024-10-01 RX ORDER — ACETAMINOPHEN 325 MG/1
650 TABLET ORAL EVERY 6 HOURS
Start: 2024-10-01

## 2024-10-01 RX ORDER — METOPROLOL SUCCINATE 25 MG/1
25 TABLET, EXTENDED RELEASE ORAL 2 TIMES DAILY
Qty: 60 TABLET | Refills: 0 | Status: SHIPPED | OUTPATIENT
Start: 2024-10-01

## 2024-10-01 RX ORDER — CYCLOBENZAPRINE HCL 10 MG
10 TABLET ORAL 3 TIMES DAILY
Qty: 90 TABLET | Refills: 0 | Status: SHIPPED | OUTPATIENT
Start: 2024-10-01

## 2024-10-01 ASSESSMENT — COGNITIVE AND FUNCTIONAL STATUS - GENERAL
DAILY ACTIVITIY SCORE: 24
MOBILITY SCORE: 24

## 2024-10-01 ASSESSMENT — PAIN DESCRIPTION - DESCRIPTORS: DESCRIPTORS: SORE

## 2024-10-01 ASSESSMENT — PAIN DESCRIPTION - ORIENTATION
ORIENTATION: POSTERIOR
ORIENTATION: POSTERIOR

## 2024-10-01 ASSESSMENT — PAIN - FUNCTIONAL ASSESSMENT
PAIN_FUNCTIONAL_ASSESSMENT: 0-10

## 2024-10-01 ASSESSMENT — PAIN SCALES - GENERAL
PAINLEVEL_OUTOF10: 0 - NO PAIN
PAINLEVEL_OUTOF10: 5 - MODERATE PAIN
PAINLEVEL_OUTOF10: 6
PAINLEVEL_OUTOF10: 2

## 2024-10-01 ASSESSMENT — PAIN DESCRIPTION - LOCATION
LOCATION: NECK
LOCATION: NECK

## 2024-10-01 NOTE — PROGRESS NOTES
10/01/24 0835   Discharge Planning   Expected Discharge Disposition Home     POD 1 cervical laminoplasty, drain removed this am. Neurosurgery signed off.     Current dc plan home when medically stable.     Addendum [id number]- [facility] paperwork faxed to Dr. Tracey's office per patient's request.

## 2024-10-01 NOTE — PROGRESS NOTES
"Junior Fontanez is a 59 y.o. male on day 5 of admission presenting with Cervical myelopathy.    Subjective   NAEON. Tolerated the procedure well.       Objective     Physical Exam  RUE D5, B5, T5, HG5, IO+  LUE D5, B5, T5, HG5, IO5  RLE HF 5, KE5, PF5, DF5  LLE HF 5, KE5, PF5, DF5   Incision c/d/I     Last Recorded Vitals  Blood pressure 118/79, pulse 80, temperature 36.9 °C (98.5 °F), temperature source Temporal, resp. rate 16, height 1.778 m (5' 10\"), weight 83 kg (183 lb), SpO2 94%.  Intake/Output last 3 Shifts:  I/O last 3 completed shifts:  In: 2200 (26.5 mL/kg) [I.V.:1000 (12 mL/kg); IV Piggyback:1200]  Out: 70 (0.8 mL/kg) [Drains:50; Blood:20]  Weight: 83 kg     Relevant Results                              Assessment/Plan   Assessment & Plan  Cervical myelopathy    Right bundle branch block (RBBB) with left anterior fascicular block (LAFB)    Primary hypertension    Anxiety     Patient is a 59 yr old M with h/o bundle branch block mitral valve regurgitation, p/w neck pain, LUE ext weakness, 9/10 MRI CS w/ severe C3-4 stenosis and cord signal change, 9/23 MRI LS chronic degen    9/30 s/p cervical laminoplasty  10/1 drain removed     Plan:  Floor  Drain is removed  Multimodal pain regimen  Ambulatory as tolerated  No lifting heavier than 15 lb  PTOT evaluation- encouraged to work with PT/OT  Please keep incision open to air and do not apply any dressing, lidocaine patches of any other ointments or cream over the incision.  Ok for shower on POD2, pad dry incision and keep dry after shower  Ok/encourage prophylactic DVT anticoagulation at this time  Follow up is arranged  Ok for discharge from neurosurgery perspective             Monica Ogden MD      "

## 2024-10-01 NOTE — PROGRESS NOTES
"Junior Fontanez is a 59 y.o. male on day 4 of admission presenting with Cervical myelopathy.    Subjective   Patient is doing really well after the surgery.  He is ambulatory       Objective     Physical Exam  Vitals reviewed.   Constitutional:       Appearance: Normal appearance.   HENT:      Head: Normocephalic and atraumatic.      Right Ear: Tympanic membrane, ear canal and external ear normal.      Left Ear: Tympanic membrane, ear canal and external ear normal.      Nose: Nose normal.      Mouth/Throat:      Pharynx: Oropharynx is clear.   Eyes:      Extraocular Movements: Extraocular movements intact.      Conjunctiva/sclera: Conjunctivae normal.      Pupils: Pupils are equal, round, and reactive to light.   Cardiovascular:      Rate and Rhythm: Normal rate and regular rhythm.      Pulses: Normal pulses.      Heart sounds: Normal heart sounds.   Pulmonary:      Effort: Pulmonary effort is normal.      Breath sounds: Normal breath sounds.   Abdominal:      General: Abdomen is flat. Bowel sounds are normal.      Palpations: Abdomen is soft.   Musculoskeletal:      Cervical back: Normal range of motion and neck supple.   Skin:     General: Skin is warm and dry.      Comments: Surgical incision in the back of the neck   Neurological:      General: No focal deficit present.      Mental Status: He is alert and oriented to person, place, and time.      Motor: Weakness present.      Coordination: Coordination abnormal.   Psychiatric:         Mood and Affect: Mood normal.         Last Recorded Vitals  Blood pressure 126/83, pulse 93, temperature 36.7 °C (98 °F), temperature source Temporal, resp. rate 16, height 1.778 m (5' 10\"), weight 83 kg (183 lb), SpO2 95%.  Intake/Output last 3 Shifts:  I/O last 3 completed shifts:  In: 2200 (26.5 mL/kg) [I.V.:1000 (12 mL/kg); IV Piggyback:1200]  Out: 20 (0.2 mL/kg) [Blood:20]  Weight: 83 kg     Relevant Results               Scheduled medications  acetaminophen, 650 mg, oral, " q6h   Or  acetaminophen, 650 mg, oral, q6h   Or  acetaminophen, 650 mg, rectal, q6h  cyclobenzaprine, 10 mg, oral, TID  docusate sodium, 100 mg, oral, BID  [Held by provider] enoxaparin, 40 mg, subcutaneous, q24h  metoprolol succinate XL, 25 mg, oral, BID  pantoprazole, 40 mg, oral, Daily before breakfast   Or  pantoprazole, 40 mg, intravenous, Daily before breakfast  polyethylene glycol, 17 g, oral, Daily      Continuous medications  potassium fyozmuf-V5-0.45%NaCl, 100 mL/hr, Last Rate: 100 mL/hr (09/30/24 0040)  lactated Ringer's, 100 mL/hr, Last Rate: 100 mL/hr (09/30/24 1330)      PRN medications  PRN medications: benzocaine-menthol, dextromethorphan-guaifenesin, guaiFENesin, HYDROmorphone, melatonin, ondansetron **OR** ondansetron, oxyCODONE, oxyCODONE  No results found for this or any previous visit (from the past 24 hour(s)).    FL less than 1 hour    Result Date: 9/30/2024  These images are not reportable by radiology and will not be interpreted by  Radiologists.     Scheduled medications  acetaminophen, 650 mg, oral, q6h   Or  acetaminophen, 650 mg, oral, q6h   Or  acetaminophen, 650 mg, rectal, q6h  cyclobenzaprine, 10 mg, oral, TID  docusate sodium, 100 mg, oral, BID  [Held by provider] enoxaparin, 40 mg, subcutaneous, q24h  metoprolol succinate XL, 25 mg, oral, BID  pantoprazole, 40 mg, oral, Daily before breakfast   Or  pantoprazole, 40 mg, intravenous, Daily before breakfast  polyethylene glycol, 17 g, oral, Daily      Continuous medications  potassium ujagrdq-Q0-9.45%NaCl, 100 mL/hr, Last Rate: 100 mL/hr (09/30/24 0040)  lactated Ringer's, 100 mL/hr, Last Rate: 100 mL/hr (09/30/24 1330)      PRN medications  PRN medications: benzocaine-menthol, dextromethorphan-guaifenesin, guaiFENesin, HYDROmorphone, melatonin, ondansetron **OR** ondansetron, oxyCODONE, oxyCODONE  No results found for this or any previous visit (from the past 24 hour(s)).  FL less than 1 hour    Result Date: 9/30/2024  These images  are not reportable by radiology and will not be interpreted by  Radiologists.                Assessment/Plan   Assessment & Plan  Cervical myelopathy    Right bundle branch block (RBBB) with left anterior fascicular block (LAFB)    Primary hypertension    Anxiety    Blood pressure doing well on metoprolol  Patient is doing well after the surgery  Continue postop care  Pulmonary cardiac status stable     I spent  minutes in the professional and overall care of this patient.      Kika Osborn MD

## 2024-10-03 LAB
BLOOD EXPIRATION DATE: NORMAL
DISPENSE STATUS: NORMAL
PRODUCT BLOOD TYPE: 8400
PRODUCT CODE: NORMAL
UNIT ABO: NORMAL
UNIT NUMBER: NORMAL
UNIT RH: NORMAL
UNIT VOLUME: 350
XM INTEP: NORMAL

## 2024-10-03 NOTE — DISCHARGE SUMMARY
Discharge Diagnosis  Cervical myelopathy    Issues Requiring Follow-Up  Hypertension    Test Results Pending At Discharge  Pending Labs       No current pending labs.            Hospital Course   Junior Fontanez is a 59 y.o. male presenting with right arm numbness and right leg weakness.  Patient was seen in the office yesterday for follow-up on MRI of the C-spine and lumbosacral spine.  Patient is having left upper arm numbness and weakness and right leg weakness progressive balance issues trouble using his right hand dropping things and developing a limp over the past few months.  Denies any bowel or bladder symptoms.  MRI C-spine showed severe C3-4 stenosis and cord compression.  MRI lumbosacral spine showed chronic degenerative changes.  Spoke to the neurosurgery on-call team who wanted patient to go to [facility].  Patient refused to go that far.  Patient was sent to the allergy emergency room for neurosurgical evaluation.  Patient is admitted for surgery on Monday.  His blood pressure is running high since he is admitted   Patient underwent cervical laminoplasty, drain removed.  He also had issues with elevated blood pressure treated with Toprol-XL.  Discharged home in stable condition    Pertinent Physical Exam At Time of Discharge  Physical Exam  Vitals reviewed.   Constitutional:       Appearance: Normal appearance.   HENT:      Head: Normocephalic and atraumatic.      Right Ear: Tympanic membrane, ear canal and external ear normal.      Left Ear: Tympanic membrane, ear canal and external ear normal.      Nose: Nose normal.      Mouth/Throat:      Pharynx: Oropharynx is clear.   Eyes:      Extraocular Movements: Extraocular movements intact.      Conjunctiva/sclera: Conjunctivae normal.      Pupils: Pupils are equal, round, and reactive to light.   Cardiovascular:      Rate and Rhythm: Normal rate and regular rhythm.      Pulses: Normal pulses.      Heart sounds: Normal heart sounds.   Pulmonary:       Effort: Pulmonary effort is normal.      Breath sounds: Normal breath sounds.   Abdominal:      General: Abdomen is flat. Bowel sounds are normal.      Palpations: Abdomen is soft.   Musculoskeletal:      Cervical back: Normal range of motion and neck supple.   Skin:     General: Skin is warm and dry.      Comments: Surgical incision on the back of the neck looking clean   Neurological:      General: No focal deficit present.      Mental Status: He is alert and oriented to person, place, and time.   Psychiatric:         Mood and Affect: Mood normal.         Home Medications     Medication List      START taking these medications     acetaminophen 325 mg tablet; Commonly known as: Tylenol; Take 2 tablets   (650 mg) by mouth every 6 hours.   cyclobenzaprine 10 mg tablet; Commonly known as: Flexeril; Take 1 tablet   (10 mg) by mouth 3 times a day.   metoprolol succinate XL 25 mg 24 hr tablet; Commonly known as:   Toprol-XL; Take 1 tablet (25 mg) by mouth 2 times a day. Do not crush or   chew.     CHANGE how you take these medications     ergocalciferol 1.25 MG (06250 UT) capsule; Commonly known as: Vitamin   D-2; Take 1 capsule (50,000 Units) by mouth 1 (one) time per week.; What   changed: additional instructions     CONTINUE taking these medications     aspirin 325 mg tablet   calcium carbonate  mg (750 mg) chewable tablet; Commonly known as:   Tums Extra Strength     STOP taking these medications     Aleve 220 mg tablet; Generic drug: naproxen sodium       Outpatient Follow-Up  Future Appointments   Date Time Provider Department Center   10/9/2024 10:45 AM Kika Osborn MD [id number] [facility]   10/16/2024 10:30 AM NEUROSURGERY [facility] NURSE [id number] [facility]   11/14/2024 10:40 AM MD [id number] [place]       Kika Osborn MD

## 2024-10-03 NOTE — PROGRESS NOTES
"Junior Fontanez is a 59 y.o. male on day 5 of admission presenting with Cervical myelopathy.    Subjective   Patient is doing really well after the surgery.  He is ambulatory       Objective     Physical Exam  Vitals reviewed.   Constitutional:       Appearance: Normal appearance.   HENT:      Head: Normocephalic and atraumatic.      Right Ear: Tympanic membrane, ear canal and external ear normal.      Left Ear: Tympanic membrane, ear canal and external ear normal.      Nose: Nose normal.      Mouth/Throat:      Pharynx: Oropharynx is clear.   Eyes:      Extraocular Movements: Extraocular movements intact.      Conjunctiva/sclera: Conjunctivae normal.      Pupils: Pupils are equal, round, and reactive to light.   Cardiovascular:      Rate and Rhythm: Normal rate and regular rhythm.      Pulses: Normal pulses.      Heart sounds: Normal heart sounds.   Pulmonary:      Effort: Pulmonary effort is normal.      Breath sounds: Normal breath sounds.   Abdominal:      General: Abdomen is flat. Bowel sounds are normal.      Palpations: Abdomen is soft.   Musculoskeletal:      Cervical back: Normal range of motion and neck supple.   Skin:     General: Skin is warm and dry.      Comments: Surgical incision in the back of the neck   Neurological:      General: No focal deficit present.      Mental Status: He is alert and oriented to person, place, and time.      Motor: Weakness present.      Coordination: Coordination abnormal.   Psychiatric:         Mood and Affect: Mood normal.         Last Recorded Vitals  Blood pressure 128/85, pulse 82, temperature 37 °C (98.6 °F), resp. rate 18, height 1.778 m (5' 10\"), weight 83 kg (183 lb), SpO2 94%.  Intake/Output last 3 Shifts:  I/O last 3 completed shifts:  In: 3840 (46.3 mL/kg) [P.O.:240; I.V.:1000 (12 mL/kg); IV Piggyback:2600]  Out: - (0 mL/kg)   Weight: 83 kg     Relevant Results               Scheduled medications      Continuous medications      PRN medications    No results " found for this or any previous visit (from the past 24 hour(s)).    No results found.    Scheduled medications      Continuous medications      PRN medications    No results found for this or any previous visit (from the past 24 hour(s)).  No results found.               Assessment/Plan   Assessment & Plan  Cervical myelopathy    Right bundle branch block (RBBB) with left anterior fascicular block (LAFB)    Primary hypertension    Anxiety    Blood pressure doing well on metoprolol  Patient is doing well after the surgery  Continue postop care  Pulmonary cardiac status stable   DC home once cleared by neurosurgery    I spent  minutes in the professional and overall care of this patient.      Kika Osborn MD

## 2024-10-09 ENCOUNTER — APPOINTMENT (OUTPATIENT)
Dept: PRIMARY CARE | Facility: CLINIC | Age: 59
End: 2024-10-09
Payer: COMMERCIAL

## 2024-10-09 ENCOUNTER — LAB (OUTPATIENT)
Dept: LAB | Facility: LAB | Age: 59
End: 2024-10-09
Payer: COMMERCIAL

## 2024-10-09 VITALS
DIASTOLIC BLOOD PRESSURE: 68 MMHG | WEIGHT: 183 LBS | SYSTOLIC BLOOD PRESSURE: 126 MMHG | BODY MASS INDEX: 26.2 KG/M2 | HEIGHT: 70 IN

## 2024-10-09 DIAGNOSIS — I10 HYPERTENSION, UNSPECIFIED TYPE: ICD-10-CM

## 2024-10-09 DIAGNOSIS — M54.12 CERVICAL RADICULOPATHY: ICD-10-CM

## 2024-10-09 DIAGNOSIS — Z23 IMMUNIZATION DUE: ICD-10-CM

## 2024-10-09 DIAGNOSIS — R79.0 DECREASED BLOOD CALCIUM: ICD-10-CM

## 2024-10-09 LAB
25(OH)D3 SERPL-MCNC: 62 NG/ML (ref 30–100)
ALBUMIN SERPL BCP-MCNC: 4.2 G/DL (ref 3.4–5)
ALP SERPL-CCNC: 73 U/L (ref 33–136)
ALT SERPL W P-5'-P-CCNC: 29 U/L (ref 10–52)
ANION GAP SERPL CALC-SCNC: 15 MMOL/L (ref 10–20)
AST SERPL W P-5'-P-CCNC: 15 U/L (ref 9–39)
BILIRUB SERPL-MCNC: 0.4 MG/DL (ref 0–1.2)
BUN SERPL-MCNC: 12 MG/DL (ref 6–23)
CA-I BLD-SCNC: 1.23 MMOL/L (ref 1.1–1.33)
CALCIUM SERPL-MCNC: 9.4 MG/DL (ref 8.6–10.6)
CHLORIDE SERPL-SCNC: 102 MMOL/L (ref 98–107)
CO2 SERPL-SCNC: 29 MMOL/L (ref 21–32)
CREAT SERPL-MCNC: 0.84 MG/DL (ref 0.5–1.3)
EGFRCR SERPLBLD CKD-EPI 2021: >90 ML/MIN/1.73M*2
ERYTHROCYTE [DISTWIDTH] IN BLOOD BY AUTOMATED COUNT: 11.9 % (ref 11.5–14.5)
GLUCOSE SERPL-MCNC: 82 MG/DL (ref 74–99)
HCT VFR BLD AUTO: 43.3 % (ref 41–52)
HGB BLD-MCNC: 14.3 G/DL (ref 13.5–17.5)
MCH RBC QN AUTO: 28.5 PG (ref 26–34)
MCHC RBC AUTO-ENTMCNC: 33 G/DL (ref 32–36)
MCV RBC AUTO: 86 FL (ref 80–100)
NRBC BLD-RTO: 0 /100 WBCS (ref 0–0)
PLATELET # BLD AUTO: 285 X10*3/UL (ref 150–450)
POTASSIUM SERPL-SCNC: 4.5 MMOL/L (ref 3.5–5.3)
PROT SERPL-MCNC: 7.4 G/DL (ref 6.4–8.2)
RBC # BLD AUTO: 5.02 X10*6/UL (ref 4.5–5.9)
SODIUM SERPL-SCNC: 141 MMOL/L (ref 136–145)
WBC # BLD AUTO: 12.8 X10*3/UL (ref 4.4–11.3)

## 2024-10-09 PROCEDURE — 36415 COLL VENOUS BLD VENIPUNCTURE: CPT

## 2024-10-09 PROCEDURE — 3074F SYST BP LT 130 MM HG: CPT | Performed by: INTERNAL MEDICINE

## 2024-10-09 PROCEDURE — 82306 VITAMIN D 25 HYDROXY: CPT

## 2024-10-09 PROCEDURE — 3078F DIAST BP <80 MM HG: CPT | Performed by: INTERNAL MEDICINE

## 2024-10-09 PROCEDURE — 3008F BODY MASS INDEX DOCD: CPT | Performed by: INTERNAL MEDICINE

## 2024-10-09 PROCEDURE — 90656 IIV3 VACC NO PRSV 0.5 ML IM: CPT | Performed by: INTERNAL MEDICINE

## 2024-10-09 PROCEDURE — 90471 IMMUNIZATION ADMIN: CPT | Performed by: INTERNAL MEDICINE

## 2024-10-09 PROCEDURE — 85027 COMPLETE CBC AUTOMATED: CPT

## 2024-10-09 PROCEDURE — 82330 ASSAY OF CALCIUM: CPT

## 2024-10-09 PROCEDURE — 99214 OFFICE O/P EST MOD 30 MIN: CPT | Performed by: INTERNAL MEDICINE

## 2024-10-09 PROCEDURE — 80053 COMPREHEN METABOLIC PANEL: CPT

## 2024-10-12 NOTE — PROGRESS NOTES
"Subjective   Patient ID: Junior Fontanez is a 59 y.o. male who presents for Follow-up.    HPI   Patient is here for hospital follow-up  He had neck surgery for cervical radiculopathy  He says he is recovering but his symptoms are not better yet which she was told that he may not recover the full function  Wound is healing but he has not started therapy  His blood pressure was running high in the hospital added more medications  He is here for follow-up  His calcium was low as well    Past recap  Patient is here for MRI of the C-spine and lumbosacral spine  His symptoms of right arm weakness and numbness and right leg weakness is progressively getting worse    Patient is here for follow-up on x-ray of C-spine and lumbar spine  Follow-up on EMG test  Patient is getting worsening of the numbness and weakness of the right arm and the right leg.  It started few months ago but progressively is getting worse.  He is limping a little.  The numbness shoots down the right arm.  He denies any trauma to the spine    Past recap  Patient is here for follow-up on blood work  Feels very tired  Per wife he has OCD and anxiety  Having a lot of pain in the neck and the back  Gets numbness in the arm and the foot     patient is here for physical  Complaining of numbness in the right arm and the right hand and complaining of numbness in the right foot sometimes dragging  Colonoscopy due next year    Patient is here for follow-up on 2D echo follow-up on blood work   Follow-up on lung CT  He has quit smoking for now 34 days  Complains of feeling fatigued gets lack of energy shortness of breath numbness and tingling in the hands and arms      patient here for physical     PMH : tonsillectomy, left bundle branch block, scoliosis  SH: smoker , no etoh  FH : M CANCER , F NHL , GF DM   Review of Systems    Objective   /68   Ht 1.778 m (5' 10\")   Wt 83 kg (183 lb)   BMI 26.26 kg/m²     Physical Exam  Vitals reviewed. "   Constitutional:       Appearance: Normal appearance.   HENT:      Head: Normocephalic and atraumatic.      Right Ear: Tympanic membrane, ear canal and external ear normal.      Left Ear: Tympanic membrane, ear canal and external ear normal.      Nose: Nose normal.      Mouth/Throat:      Pharynx: Oropharynx is clear.   Eyes:      Extraocular Movements: Extraocular movements intact.      Conjunctiva/sclera: Conjunctivae normal.      Pupils: Pupils are equal, round, and reactive to light.   Cardiovascular:      Rate and Rhythm: Normal rate and regular rhythm.      Pulses: Normal pulses.      Heart sounds: Normal heart sounds.   Pulmonary:      Effort: Pulmonary effort is normal.      Breath sounds: Normal breath sounds.   Abdominal:      General: Abdomen is flat. Bowel sounds are normal.      Palpations: Abdomen is soft.   Musculoskeletal:      Cervical back: Normal range of motion and neck supple.      Comments: Scoliosis   Skin:     General: Skin is warm and dry.      Comments: Surgical scar healing well   Neurological:      General: No focal deficit present.      Mental Status: He is alert and oriented to person, place, and time.      Motor: Weakness present.      Coordination: Coordination abnormal.      Gait: Gait abnormal.      Comments: Reflexes hyper   Psychiatric:         Mood and Affect: Mood normal.         Assessment/Plan   Problem List Items Addressed This Visit    None  Visit Diagnoses       Cervical radiculopathy        Relevant Orders    Referral to Physical Therapy    Referral to Occupational Therapy    Hypertension, unspecified type        Relevant Orders    CBC (Completed)    Comprehensive Metabolic Panel (Completed)    Vitamin D 25-Hydroxy,Total (for eval of Vitamin D levels) (Completed)    Calcium, Ionized (Completed)    Decreased blood calcium        Relevant Orders    CBC (Completed)    Comprehensive Metabolic Panel (Completed)    Vitamin D 25-Hydroxy,Total (for eval of Vitamin D levels)  (Completed)    Calcium, Ionized (Completed)    Immunization due        Relevant Orders    Flu vaccine, trivalent, preservative free, age 6 months and greater (Fluraix/Fluzone/Flulaval) (Completed)            Past recap   physical normal  Blood work ordered  Tdap not available follow-up to get it  Scheduled for an eye examination next week  Had colonoscopy at 49  Discussed diet exercise and healthy lifestyle     7/17/2023  Physical normal   EKG done shows right bundle branch block  Discussed with the patient that he needs to rule out sleep apnea needs rule out lung issues  We will do CT cardiac scoring  We will do CT lung cancer screening as he had lung nodules in the past  Check 2D echo  Routine blood work ordered  Encouraged to quit smoking  Follow-up after the test    8/26/2023  2D echo shows moderate pulmonary regurg slightly reduced ejection fraction  That could explain right bundle branch block  Advised patient to go for sleep study  CAT scan shows emphysema and upper lung fields congratulated for quitting smoking  We will do nuclear pharmacological stress test  Repeat blood pressure 120/72  Will refer to cardiology for further evaluation    11/18/2023  Blood work reviewed  Stress test normal  Mild obstructive sleep apnea  Vitamin D low at 17  Vitamin D supplement started  Mild upper lobe emphysema  Patient has quit smoking,  Follow-up for routine physical next year    7/20/2024  Physical normal  Routine blood work ordered CBC be fasting with TSH vitamin D was low before  Check PSA  Patient's symptoms of right arm numbness could be related to scoliosis  Will do EMG right arm and right leg to evaluate further  Follow-up after blood work and EMG    7/27/2024  Blood work reviewed  All blood work in normal range  No electrolyte deficiency to explain numbness or tingling in the arms and legs  Vitamin D deficiency  Vitamin D 50,000 q. weekly for 3 months then 2000 a day  X-ray C-spine thoracic spine lumbar  spine  EMG  Follow-up after the test    8/24/2024  C-spine and lumbosacral spine shows advanced discogenic degenerative changes  Patient's neurological examination is worse than last time  Will get MRI of C-spine and lumbosacral spine in view of abnormal x-rays  Hyperreflexic reflexes  EMG also shows possibility of radiculopathy in the cervical area and lumbar area  Follow-up after the MRI    9/26/2024  MRI of C-spine and MRI of lumbosacral spine reviewed  EMG results reviewed  Patient has cervical spinal cord compression stenosis   Call the on-call neurosurgery team  She recommended patient to be transferred to [facility] to be able to operate on Monday  Patient refused  Will refer patient to a [facility] ER for evaluation of neurosurgery to see if patient needs to be inpatient or can be treated outpatient    10/9/2024  Patient is doing very well postoperatively  Refer patient to physical therapy for cervical radiculopathy  Blood pressure is doing well on the thyroid medication  Will check CBC CMP  Patient had low calcium will check ionized calcium and vitamin D

## 2024-10-15 ENCOUNTER — TELEPHONE (OUTPATIENT)
Dept: NEUROSURGERY | Facility: HOSPITAL | Age: 59
End: 2024-10-15
Payer: COMMERCIAL

## 2024-10-15 NOTE — TELEPHONE ENCOUNTER
Talkled to pt and he said that his incision is with out redness, swelling or drainage. I discussed wound care and activity. I was able to change his post op appointment to Bharg[facility] from [place] since he lives in [place] on the [place]. He will see Dr. Tracey on 11/12.

## 2024-10-16 ENCOUNTER — APPOINTMENT (OUTPATIENT)
Dept: NEUROSURGERY | Facility: HOSPITAL | Age: 59
End: 2024-10-16
Payer: COMMERCIAL

## 2024-10-17 ENCOUNTER — EVALUATION (OUTPATIENT)
Dept: OCCUPATIONAL THERAPY | Facility: CLINIC | Age: 59
End: 2024-10-17
Payer: COMMERCIAL

## 2024-10-17 DIAGNOSIS — M54.12 CERVICAL RADICULOPATHY: ICD-10-CM

## 2024-10-17 PROCEDURE — 97165 OT EVAL LOW COMPLEX 30 MIN: CPT | Mod: GO | Performed by: OCCUPATIONAL THERAPIST

## 2024-10-17 PROCEDURE — 97110 THERAPEUTIC EXERCISES: CPT | Mod: GO | Performed by: OCCUPATIONAL THERAPIST

## 2024-10-17 SDOH — ECONOMIC STABILITY: GENERAL: QUALITY OF LIFE: GOOD

## 2024-10-17 ASSESSMENT — ENCOUNTER SYMPTOMS: PAIN SCALE: 0

## 2024-10-17 NOTE — PROGRESS NOTES
"Evaluation/Treatment    Patient Name: Junior Fontanez  MRN: [id number]  : 1965  Today's Date: 10/17/24    Time Calculation  Start Time: 0845  Stop Time: 930  Time Calculation (min): 45 min  OT Evaluation Time Entry  OT Evaluation (Low) Time Entry: 15  OT Therapeutic Procedures Time Entry  Therapeutic Exercise Time Entry: 30      Subjective   Current Problem/Diagnosis:  1. Cervical radiculopathy  Referral to Occupational Therapy    Follow Up In Occupational Therapy        Subjective Evaluation    History of Present Illness  Mechanism of injury: 59-year-old male who presents s/p C3 laminectomy for cord decompression and C4-C5 laminoplasty with osteoplastic reconstruction of  the posterior elements using laminoplasty plates on 24 due to progressively worsening over last several months; at yearly physical with PCP patient voiced concerns leading to work-up and ultimately above surgery.      Quality of life: good    Pain  Current pain ratin    Social Support  Lives with: significant other    Hand dominance: right    Diagnostic Tests  MRI studies: abnormal (Multilevel degenerative changes of cervical spine most pronounced for severe C3-C4 spinal canal stenosis where there is a short segment of abnormal spinal cord signal, likely representing myelomalacia. Varying degrees mild-severe neural foraminal stenosis.)    Patient Goals  Patient goals for therapy: improved balance, increased motion, increased strength, independence with ADLs/IADLs, return to sport/leisure activities and return to work  Patient goal: \"I want my arm to get stronger.\"      Precautions: No rotation/flexion/extension of neck; No push/pull/lift >8#       Objective     Prior Functional Status: Fully Independent     Work History:     Occupation and Activities:  Work status: off work  Job title/type of work: Office work; customer care    Current functional limitations: Grooming, Bathing, Dressing, Lifting, Holding, Writing, Computer, Tool " handling, and Driving       Objective     Sensation: +Numbness/tingling throughout RUE    Appearance: +Surgical incision posterior neck ~9 cm in length; healed/closed with good signs of healing    Edema: N/a    Coordination:       9 Hole Peg Test: R= 27 sec  L= 24 sec   [place]:  R= 1:10 in  L= 1:09 min    Range of Motion/Strength:     Upper Extremity ROM    AROM     Right Left   SHOULDER Flexion WFL WFL    Extension WFL WFL    Abduction WFL WFL    Internal Rotation WFL WFL    External Rotation WFL WFL     ELBOW Extension WFL WFL    Flexion WFL WFL     FOREARM Pronation WFL WFL    Supination WFL WFL     WRIST Flexion WFL WFL    Extension WFL WFL    Radial Deviation WFL WFL    Ulnar Deviation WFL WFL   **MMT deferred due to recent sx    Hand Range of Motion   All digits WFL B hands.      Hand Strength   (lbs) Right Left   1     2 66# 81#   3     4     5       Pinch Right Left   2-pt 10# 10#   3-pt 13# 17#   Lateral 17# 26#       Outcome Measure: UEFI=46/80; 43% impaired    Splinting: N/a    Modalities: N/a    Therapy/Activity: Therapist instructed patient in R hand strengthening including isometric composite and lumbrical gripping and thenar and hypothenar strengthening with 2 band resistance x10 reps x 3 sets; written handout issued; HEP established. Therapist instructed patient in in-hand manipulation and dexterity tasks to promote coordination and dual tasking of hand.    EDUCATION: See above.        Assessment & Plan     Assessment  Impairments: abnormal coordination, activity intolerance, impaired physical strength and lacks appropriate home exercise program  Assessment details: Patient is a 59-year-old male who presents s/p C-spine laminectomy and laminoplasty with pre and post-op RUE weakness and incoordination and decreased functional use of dominant UE. Skilled OT intervention indicated to address deficits and facilitate greater functional use of RUE.     Low complexity evaluation selected due to  "patient's clinical presentation, medical stability, and condition uncomplicated by existing co-morbidities that may affect patient's rehab tolerance, progression, and potential.   Prognosis: good    Goals  \"I want my arm to get stronger.\"    Plan  Planned therapy interventions: fine motor coordination training, flexibility, functional ROM exercises, home exercise program, motor coordination training, strengthening, stretching and therapeutic activities  Frequency: 1x week  Duration in visits: 4  Treatment plan discussed with: patient  Plan details: MMO; 60 visits/yr           Goals:  Active       OT Goals       1. Patient will increase R hand  strength by at least 15# for improved functional use of dominant hand.       Start:  10/17/24    Expected End:  11/07/24            2. Patient will improve R hand coordination AEB Standardized Test scores by 20% for improved functional use of dominant hand.       Start:  10/17/24    Expected End:  11/07/24            3. Patient will increase RUE strength to 4+/5 grossly at all planes for improved functional use of dominant hand.       Start:  10/17/24    Expected End:  11/14/24            4. Patient will increase overall functional ease and independence AEB UEFI score of at least 70/80 for improved quality of life for patient.       Start:  10/17/24    Expected End:  11/14/24               "

## 2024-10-24 ENCOUNTER — TREATMENT (OUTPATIENT)
Dept: OCCUPATIONAL THERAPY | Facility: CLINIC | Age: 59
End: 2024-10-24
Payer: COMMERCIAL

## 2024-10-24 ENCOUNTER — EVALUATION (OUTPATIENT)
Dept: PHYSICAL THERAPY | Facility: CLINIC | Age: 59
End: 2024-10-24
Payer: COMMERCIAL

## 2024-10-24 DIAGNOSIS — R26.81 UNSTEADY GAIT WHEN WALKING: Primary | ICD-10-CM

## 2024-10-24 DIAGNOSIS — M54.12 CERVICAL RADICULOPATHY: ICD-10-CM

## 2024-10-24 DIAGNOSIS — R29.898 RIGHT LEG WEAKNESS: ICD-10-CM

## 2024-10-24 PROCEDURE — 97110 THERAPEUTIC EXERCISES: CPT | Mod: GO | Performed by: OCCUPATIONAL THERAPIST

## 2024-10-24 PROCEDURE — 97162 PT EVAL MOD COMPLEX 30 MIN: CPT | Mod: GP

## 2024-10-24 ASSESSMENT — PAIN SCALES - GENERAL: PAINLEVEL_OUTOF10: 0 - NO PAIN

## 2024-10-24 ASSESSMENT — ENCOUNTER SYMPTOMS
LOSS OF SENSATION IN FEET: 0
DEPRESSION: 0
OCCASIONAL FEELINGS OF UNSTEADINESS: 1

## 2024-10-24 ASSESSMENT — ACTIVITIES OF DAILY LIVING (ADL): POOR_BALANCE: 1

## 2024-10-24 ASSESSMENT — PAIN - FUNCTIONAL ASSESSMENT: PAIN_FUNCTIONAL_ASSESSMENT: 0-10

## 2024-10-24 NOTE — PROGRESS NOTES
"Occupational Therapy Treatment  Patient Name: Junior Fontanez  MRN: [id number]  OT Received on: 10/24/2024        Time Calculation  Start Time: 1230  Stop Time: 1312  Time Calculation (min): 42 min  OT Therapeutic Procedures Time Entry  Therapeutic Exercise Time Entry: 42    Insurance:  Visit Number: 2 of 4  Insurance Type: MMO      Subjective   Problem List Items Addressed This Visit             ICD-10-CM    Cervical radiculopathy M54.12     Current Problem/Reason for visit:   59-year-old male who presents s/p C3 laminectomy for cord decompression and C4-C5 laminoplasty with osteoplastic reconstruction of  the posterior elements using laminoplasty plates on 9/30/24 due to progressively worsening over last several months; at yearly physical with PCP patient voiced concerns leading to work-up and ultimately above surgery.     Referred by: Dr. Osborn    Patient reports \"I'm feeling pretty good.\"    Precautions: No rotation/flexion/extension of neck; No push/pull/lift >8#    Performing HEP?: Yes    Pain:  Pain Assessment  Pain Assessment: 0-10  0-10 (Numeric) Pain Score: 0 - No pain    Objective   Patient to PT evaluation prior to OT tx sx.     (lbs) Right Left   1     2 66# 81#   3     4     5       Pinch Right Left   2-pt 7# (-3) 10#   3-pt 13# (NC) 17#   Lateral 20# (+3) 26#       Edema: N/a  Sensory: Grossly Intact  Numbness/Tingling: +Numbness/tingling throughout RUE      Treatment:    Therapeutic Exercise:  UBE seated without resistance x6 minutes; 3 FW/3BW.  Scapular retraction with green theraband x15 reps x 3 sets; intermittent tactile prompting for tech and proper body mechanics following demonstration.  RUE flexion/extension, ER/IR x 10 reps x 3 sets with orange theraband.  RUE elbow flexion/extension with FA neutral, pronated, and supinated x10 reps x 3 sets with 2# free weight.  R wrist flexion/extension with 2# free weight x10 reps x 3 sets.  FA sup/pro and wrist RD/UD with hammer hand positioned at " "lower 2/3 of shaft x10 reps x 3 sets.  Graded clothespins red-->blue; 5 clips on/off; 2-pt, 3-pt, and lateral pinch patterns.  Red therabar bends x10 reps x 3 sets.  Functional reach task x10 cones retrieve left pass anteriorly to R and place overhead with RUE; decreased R shoulder ER noted.      Post-tx pain: 0/10; \"The numbness is always there.\"    Assessment/Plan Patient demonstrates good tolerance for all intervention; will continue to advance as indicated to optimize functional use of dominant UE.      OP EDUCATION:  Education  Individual(s) Educated: Patient  Home Program: Fine motor tasks, Strengthening    Goals:  Active       OT Goals       1. Patient will increase R hand  strength by at least 15# for improved functional use of dominant hand.       Start:  10/17/24    Expected End:  11/07/24            2. Patient will improve R hand coordination AEB Standardized Test scores by 20% for improved functional use of dominant hand.       Start:  10/17/24    Expected End:  11/07/24            3. Patient will increase RUE strength to 4+/5 grossly at all planes for improved functional use of dominant hand.       Start:  10/17/24    Expected End:  11/14/24            4. Patient will increase overall functional ease and independence AEB UEFI score of at least 70/80 for improved quality of life for patient.       Start:  10/17/24    Expected End:  11/14/24               "

## 2024-10-24 NOTE — PROGRESS NOTES
Physical Therapy Evaluation and Treatment     Patient Name: Junior Fontanez  MRN: [id number]  Encounter date: 10/24/2024  Time Calculation  Start Time: 1150  Stop Time: 1225  Time Calculation (min): 35 min  PT Evaluation Time Entry  PT Evaluation (Moderate) Time Entry: 35    Visit # 1 of 10  Visits/Dates Authorized: No auth, 60 visits    Current Problem:   Problem List Items Addressed This Visit             ICD-10-CM    Cervical radiculopathy M54.12    Relevant Orders    Follow Up In Physical Therapy    Unsteady gait when walking - Primary R26.81    Right leg weakness R29.898     Precautions:  Post op restrictions:  - Do not lift >8 lb  - Modified cervical AROM (avoid end range)       Subjective   Mechanism of injury: 59-year-old male who presents s/p C3 laminectomy for cord decompression and C4-C5 laminoplasty with osteoplastic reconstruction of  the posterior elements using laminoplasty plates on 9/30/24 due to progressively worsening over last several months; at yearly physical with PCP patient voiced concerns leading to work-up and ultimately above surgery     Current post op restrictions in place:   Lifting restrictions: 8.5 lb  Modified cervical AROM - avoid end ranges  Not cleared to drive    Pain in neck is gone  Right UE still feels weak and numb; entire arm and hand   Was told by surgeon that the surgery will help prevent the weakness and numbness from progressing, but not necessarily from improving  Still notes difficulty walking; some unsteadiness  Muscle relaxers and Tylenol 3x/day  Sleeping well  Lives with girlfriend who recently had a hip replacement   Office job; 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  Will need to be able to drive to work  Off on FMLA through 12/26/24  May discuss if he can go back prior to then at next MD appt on 11/12/24       Objective  Integumentary: Incision intact and appears to be healing well; some scar tissue and pink around incision    UE Neuro Screen:    Dermatomes:  C4: WNL  "B  C5: WNL B  C6: WNL B  C7: WNL B  C8: WNL B  T1: WNL B    Myotomes:   C4: Impaired R  C5: Impaired R  C6: Impaired R  C7: Impaired R  C8: Impaired R  T1: Impaired R      LE Neuro Screen:    Myotomes:  L2: Impaired R  L3: Impaired R  L4: Impaired R  L5: Impaired R  S1: Impaired R  S2: Impaired R    Dermatomes:  L2: WNL B  L3: WNL B  L4: WNL B  L5: WNL B  S1: WNL B  S2: WNL B    Cervical AROM: Significantly impaired secondary to surgery and post op restrictions - formal measurements at this time    Gait: Independent without device; abnormal gait pattern; decreased arm swing R, wider MARIE; impaired swing phase on R resulting in toe out landing; occasionally doesn't clear R toe during toe off phase    Stairs: Independent with assist of 1 UE    4 Stage Balance  -NOBS: 10\"  -Modified Tandem: 10\"  -Tandem: 7\" R, 10\" L  -SLS: 5\" R, 10\" L    HEP / Access Codes:   Access Code: [email or web address]  URL: [email or web address]  Date: 10/24/2024  Prepared by: Ashley Harper    Exercises  - Tandem Stance  - 2 x daily - 7 x weekly - 1-2 sets - 5 reps - 10-30 hold  - Single Leg Stance  - 2 x daily - 7 x weekly - 1-2 sets - 5 reps - 10-30 hold  - Seated Long Arc Quad  - 2 x daily - 7 x weekly - 3 sets - 10-15 reps  - Seated Hamstring Stretch  - 2 x daily - 7 x weekly - 1 sets - 3-5 reps - 10-30 hold  - Sit to Stand Without Arm Support  - 2 x daily - 7 x weekly - 1-2 sets - 10-15 reps    Assessment & Plan     Assessment  Impairments: abnormal coordination, abnormal gait, abnormal muscle firing, abnormal or restricted ROM, activity intolerance, impaired balance, impaired physical strength, lacks appropriate home exercise program and safety issue  Barriers to therapy: None  Prognosis: good    Plan  Therapy options: will be seen for skilled physical therapy services  Planned modality interventions: electrical stimulation/Russian stimulation and TENS  Planned therapy interventions: balance/weight-bearing training, body mechanics training, " "functional ROM exercises, home exercise program, gait training, manual therapy, neuromuscular re-education, strengthening, stretching and soft tissue mobilization  Frequency: 2x week  Duration in visits: 10  Treatment plan discussed with: patient       Moderate complexity due to patient's clinical presentation being evolving with changing characteristics, with comorbidities/complexities to include cervical surgery, HTN, unsteady gait, all of which may negatively impact rehab tolerance and progression.            Goals:   Active       PT Problem       Driving Goal       Start:  10/24/24    Expected End:  12/31/24       Pt will demonstrate at least 45 deg cervical rotation R/L to allow for ability to safely drive with minimal compensation from trunk         Leg Symmetry Goal       Start:  10/24/24    Expected End:  12/31/24       Pt will demonstrate strength of Right LE (hip/knee/ankle) that is symmetrical to Left to allow for smooth, fluid movements and minimize gait deviations and compensations         Gait Goal       Start:  10/24/24    Expected End:  12/31/24       Pt will demonstrate a normal gait pattern with consistent Right foot clearance and improved arm swing         Balance Goal       Start:  10/24/24    Expected End:  12/31/24       Pt will improve single leg balance without UE support from 3\" to 10\" to improve score on 4 Stage Balance Test thereby reducing falls risk           "

## 2024-10-31 ENCOUNTER — APPOINTMENT (OUTPATIENT)
Dept: OCCUPATIONAL THERAPY | Facility: CLINIC | Age: 59
End: 2024-10-31
Payer: COMMERCIAL

## 2024-11-01 ENCOUNTER — TREATMENT (OUTPATIENT)
Dept: PHYSICAL THERAPY | Facility: CLINIC | Age: 59
End: 2024-11-01
Payer: COMMERCIAL

## 2024-11-01 ENCOUNTER — TREATMENT (OUTPATIENT)
Dept: OCCUPATIONAL THERAPY | Facility: CLINIC | Age: 59
End: 2024-11-01
Payer: COMMERCIAL

## 2024-11-01 DIAGNOSIS — M54.12 CERVICAL RADICULOPATHY: ICD-10-CM

## 2024-11-01 PROCEDURE — 97110 THERAPEUTIC EXERCISES: CPT | Mod: GP | Performed by: PHYSICAL THERAPIST

## 2024-11-01 PROCEDURE — 97110 THERAPEUTIC EXERCISES: CPT | Mod: GO | Performed by: OCCUPATIONAL THERAPIST

## 2024-11-01 PROCEDURE — 97112 NEUROMUSCULAR REEDUCATION: CPT | Mod: GP | Performed by: PHYSICAL THERAPIST

## 2024-11-01 ASSESSMENT — PAIN - FUNCTIONAL ASSESSMENT
PAIN_FUNCTIONAL_ASSESSMENT: 0-10
PAIN_FUNCTIONAL_ASSESSMENT: 0-10

## 2024-11-01 ASSESSMENT — PAIN SCALES - GENERAL
PAINLEVEL_OUTOF10: 0 - NO PAIN
PAINLEVEL_OUTOF10: 0 - NO PAIN

## 2024-11-04 DIAGNOSIS — G95.9 CERVICAL MYELOPATHY: ICD-10-CM

## 2024-11-04 DIAGNOSIS — Z98.890 H/O CERVICAL SPINE SURGERY: ICD-10-CM

## 2024-11-07 ENCOUNTER — TREATMENT (OUTPATIENT)
Dept: PHYSICAL THERAPY | Facility: CLINIC | Age: 59
End: 2024-11-07
Payer: COMMERCIAL

## 2024-11-07 ENCOUNTER — TREATMENT (OUTPATIENT)
Dept: OCCUPATIONAL THERAPY | Facility: CLINIC | Age: 59
End: 2024-11-07
Payer: COMMERCIAL

## 2024-11-07 DIAGNOSIS — M54.12 CERVICAL RADICULOPATHY: ICD-10-CM

## 2024-11-07 PROCEDURE — 97110 THERAPEUTIC EXERCISES: CPT | Mod: GP,CQ

## 2024-11-07 PROCEDURE — 97110 THERAPEUTIC EXERCISES: CPT | Mod: GO,CO

## 2024-11-07 PROCEDURE — 97112 NEUROMUSCULAR REEDUCATION: CPT | Mod: GP,CQ

## 2024-11-07 ASSESSMENT — PAIN DESCRIPTION - DESCRIPTORS: DESCRIPTORS: NUMBNESS

## 2024-11-07 ASSESSMENT — PAIN - FUNCTIONAL ASSESSMENT
PAIN_FUNCTIONAL_ASSESSMENT: 0-10

## 2024-11-07 ASSESSMENT — PAIN SCALES - GENERAL
PAINLEVEL_OUTOF10: 0 - NO PAIN

## 2024-11-07 NOTE — PROGRESS NOTES
"Occupational Therapy Treatment  Patient Name: Junior Fontanez  MRN: [id number]  OT Received on: 11/7/2024 OT Received On: 11/07/24      Time Calculation  Start Time: 1028  Stop Time: 1123  Time Calculation (min): 55 min  OT Therapeutic Procedures Time Entry  Therapeutic Exercise Time Entry: 55    Insurance:  Visit Number: 4 of up to 6  MMO / NO AUTH / 60 V YEAR OT/PT/ST - 0 USED 10/15/24 / $20 CO PAY / DED MET - OOP 4500 MET 2702.45 / AVAILITY # [id number]       Subjective   Problem List Items Addressed This Visit             ICD-10-CM       Neuro    Cervical radiculopathy M54.12     Referred by:Dr. Osborn, next follow up 11/12/24     Patient reports \" I still notice weakness in my right arm and it is still numb\". Pt too medication the last 2 of 7 days  for pain, no modalities.     Precautions: no rotation/flexion/ext of neck, no push/pull/lift more than 8#    Performing HEP?: Yes    Pain:  Pain Assessment  Pain Assessment: 0-10  0-10 (Numeric) Pain Score: 0 - No pain  Pain Type: Chronic pain  Pain Location: Arm  Pain Orientation: Right  Pain Descriptors: Numbness    Objective   UEFI completed with patient at 59/80 ( was 46/80 at [facility])  Coordination:                  9 Hole Peg Test:         R= 26.3 ( was 27 sec)  L= 20.9 ( was 24 sec)              [place]:                 R= 1:03 ( was 1:10 min)  L= 1:03 ( was 1:09 min)    Hand Strength   (lbs) Right Left   1       2 70 ( was 66#) 81#   3       4       5          Pinch Right Left   2-pt 10#( same)  10#   3-pt 15 ( was 13#) 17#   Lateral 19 ( was 17#) 27 ( was 26#)     Edema: none noted    Sensory: impaired   Numbness/Tingling: noted in entire R arm, continuous. Educated patient in re-sensitization tech, able to return demo from writer.  Treatment:    Therapeutic Exercise:  Educated pt in /completed full UB PRE program with 2# resistance, 1 set of 10 reps for abduction, horizontal abduction, flexion to head level, bicep flexion with 3#, supinaton/pronation " with 3# and tricep punches with 2#. Written HEP provided  BTE completed today at 2 full charts at the following increased resistances:  -gripper at 78# ( was 70)  - initiated 3 point pinch at 35#  -jar lid horizontally at  5# ( was 4#)  Provided patient with increased resistance Medium/low orange theraputty to upgrade his existing HEP for grasp and pinch .  Seated UBE at level 3.0 ( up from 2.0) at 3 min forward and 3 min reverse.   Reviewed theraband knot tie/un tie and theraband UB HEP. Stated he is completing and continues to be a challenge     Post-tx pain:0/10, moderate fatigue    Assessment/Plan Noted increase in UEFI , R hand  and pinch and standardized testing. Increased tolerance to increased resistance on BTE and UBE. Pt continues to be limited by R UE weakness and numbness. Pt to follow through with re-sensitization tech, alternating theraband and added PRE program along with use of increased resistance putty and t-band knots.   OP EDUCATION:  Education  Individual(s) Educated: Patient  Education Provided: Risk and benefits of OT discussed with patient or other, POC discussed and agreed upon, Ergonomics and postural realignment  Home Program: Fine motor tasks, Strengthening  Equipment: Other, Thera-putty (2# weights, orange theraputty)  Risk and Benefits Discussed with Patient/Caregiver/Other: yes  Patient/Caregiver Demonstrated Understanding: yes  Plan of Care Discussed and Agreed Upon: yes  Patient Response to Education: Patient/Caregiver Performed Return Demonstration of Exercises/Activities, Patient/Caregiver Asked Appropriate Questions, Patient/Caregiver Verbalized Understanding of Information    Goals:  Active       OT Goals       1. Patient will increase R hand  strength by at least 15# for improved functional use of dominant hand. (Progressing)       Start:  10/17/24    Expected End:  11/07/24            2. Patient will improve R hand coordination AEB Standardized Test scores by 20% for  improved functional use of dominant hand. (Progressing)       Start:  10/17/24    Expected End:  11/07/24            3. Patient will increase RUE strength to 4+/5 grossly at all planes for improved functional use of dominant hand. (Progressing)       Start:  10/17/24    Expected End:  11/14/24            4. Patient will increase overall functional ease and independence AEB UEFI score of at least 70/80 for improved quality of life for patient. (Progressing)       Start:  10/17/24    Expected End:  11/14/24

## 2024-11-07 NOTE — PROGRESS NOTES
"Physical Therapy Treatment    Patient Name: Junior Fontanez  MRN: [id number]  Encounter date: 11/7/2024    Time Calculation  Start Time: 0945  Stop Time: 1028  Time Calculation (min): 43 min  PT Therapeutic Procedures Time Entry  Neuromuscular Re-Education Time Entry: 23  Therapeutic Exercise Time Entry: 18    Visit # 3 of 20+  Visits/Dates Authorized: 60  10/23/24: MMO - NO AUTH / OOP not met / $20 copay / 60V - 0 USED / AVAILITY [id number]    Current Problem:   Problem List Items Addressed This Visit             ICD-10-CM    Cervical radiculopathy M54.12       Surgery: s/p C3 laminectomy for cord decompression and C4-C5 laminoplasty with osteoplastic reconstruction of  the posterior elements using laminoplasty plates on 9/30/24  Surgery date: 9/30/24    Precautions:    Current post op restrictions in place:   Lifting restrictions: 8.5 lb  Modified cervical AROM - avoid end ranges  Not cleared to drive       Subjective   General:    Patient states he continues to deal with R UE numbness, no pain. Patient reports they have been doing HEP, maybe every other day. Patient states the R LE is still uncoordinated and weak.     Pre-Treatment Symptoms:   Pain Assessment: 0-10  0-10 (Numeric) Pain Score: 0 - No pain (R UE numb)    Objective   Findings:   Vitals:             Treatments:      Therapeutic Exercise 63927:        *2.5# ankle weight for proprio  Rec bike L4 6 min/airdyne 5mi. or elliptical L3 5min  Tband sidesteps green 2 laps  Tband monsters F/B- green 2 laps   Tband hip extension green 1x15 R/L   Gastrco/soleus stretches 15-20 sec x 1 ea    Deferred:  STS with 6# med ball /(cannot go above 8# yet)  Shuttle hip ext 12.5# with legs, light UE support  HR x15, lean on counter TR's x15  Step ups F/L, 7in no UE x10 ea  Eccentric lowering/heel taps 3in, 1 UE x15, controlled unlock on R knee, controlled descent    Neuromuscular Re-Ed 19010:       *2.5# ankle weight RLE for proprio  Hurdles 6\" , F/L, F/B WS 2.5# ankle " "weight  Tandem gait foam x6   Foam side step x6 , then with 6\" hurdles x6  Tandem stance foam 30 sec x 2 R/L   RB balance AP staggered rocks , normal stance balance / Lateral rocks and balance 1min ea   Modified SLS 7\" firm R only 30 sec x 3    Deferred:   Foam alt toe taps 7in step  Modif SLS on foam and 7in step, reaching cross body, hip flexion taps  [place]/grape vine walk  Gait with 180/360 degree turns for coordination of RLE    Therapeutic Activity 50314:      Gait Training 69578:       Manual Therapy 45835:       Modalities:       HEP / Access Codes:   Isued HEP2GO sheet; copy in chart 11/1/24  Access Code: [email or web address]  Exercises  - Tandem Stance  - 2 x daily - 7 x weekly - 1-2 sets - 5 reps - 10-30 hold  - Single Leg Stance  - 2 x daily - 7 x weekly - 1-2 sets - 5 reps - 10-30 hold  - Seated Long Arc Quad  - 2 x daily - 7 x weekly - 3 sets - 10-15 reps  - Seated Hamstring Stretch  - 2 x daily - 7 x weekly - 1 sets - 3-5 reps - 10-30 hold  - Sit to Stand Without Arm Support  - 2 x daily - 7 x weekly - 1-2 sets - 10-15 reps    Assessment:    Patient slowly improving with balance activities, able to complete progressions with tandem and side stepping on compliant surfaces with min-mod LOB. Patient had good effort with all exercises and was fatigued by end of session.     Post-Treatment Symptoms:   Response to Interventions: same    Plan:    Therapy options: will be seen for skilled physical therapy services  Planned modality interventions: electrical stimulation/Russian stimulation and TENS  Planned therapy interventions: balance/weight-bearing training, body mechanics training, functional ROM exercises, home exercise program, gait training, manual therapy, neuromuscular re-education, strengthening, stretching and soft tissue mobilization  Frequency: 2x week  Duration in visits: 10    Goals:   Active       PT Problem       Driving Goal       Start:  10/24/24    Expected End:  12/31/24       Pt will demonstrate " "at least 45 deg cervical rotation R/L to allow for ability to safely drive with minimal compensation from trunk         Leg Symmetry Goal       Start:  10/24/24    Expected End:  12/31/24       Pt will demonstrate strength of Right LE (hip/knee/ankle) that is symmetrical to Left to allow for smooth, fluid movements and minimize gait deviations and compensations         Gait Goal       Start:  10/24/24    Expected End:  12/31/24       Pt will demonstrate a normal gait pattern with consistent Right foot clearance and improved arm swing         Balance Goal       Start:  10/24/24    Expected End:  12/31/24       Pt will improve single leg balance without UE support from 3\" to 10\" to improve score on 4 Stage Balance Test thereby reducing falls risk             "

## 2024-11-09 ENCOUNTER — HOSPITAL ENCOUNTER (OUTPATIENT)
Dept: RADIOLOGY | Facility: CLINIC | Age: 59
Discharge: HOME | End: 2024-11-09
Payer: COMMERCIAL

## 2024-11-09 DIAGNOSIS — G95.9 CERVICAL MYELOPATHY: ICD-10-CM

## 2024-11-09 DIAGNOSIS — Z98.890 H/O CERVICAL SPINE SURGERY: ICD-10-CM

## 2024-11-09 PROCEDURE — 72040 X-RAY EXAM NECK SPINE 2-3 VW: CPT | Performed by: RADIOLOGY

## 2024-11-09 PROCEDURE — 72040 X-RAY EXAM NECK SPINE 2-3 VW: CPT

## 2024-11-12 ENCOUNTER — OFFICE VISIT (OUTPATIENT)
Dept: NEUROSURGERY | Facility: CLINIC | Age: 59
End: 2024-11-12
Payer: COMMERCIAL

## 2024-11-12 VITALS
DIASTOLIC BLOOD PRESSURE: 96 MMHG | HEART RATE: 90 BPM | TEMPERATURE: 96.9 F | SYSTOLIC BLOOD PRESSURE: 150 MMHG | WEIGHT: 185 LBS | HEIGHT: 70 IN | BODY MASS INDEX: 26.48 KG/M2

## 2024-11-12 DIAGNOSIS — Z09 POSTOPERATIVE FOLLOW-UP: Primary | ICD-10-CM

## 2024-11-12 PROCEDURE — 3077F SYST BP >= 140 MM HG: CPT | Performed by: NEUROLOGICAL SURGERY

## 2024-11-12 PROCEDURE — 3080F DIAST BP >= 90 MM HG: CPT | Performed by: NEUROLOGICAL SURGERY

## 2024-11-12 PROCEDURE — 3008F BODY MASS INDEX DOCD: CPT | Performed by: NEUROLOGICAL SURGERY

## 2024-11-12 PROCEDURE — 1036F TOBACCO NON-USER: CPT | Performed by: NEUROLOGICAL SURGERY

## 2024-11-12 PROCEDURE — 99211 OFF/OP EST MAY X REQ PHY/QHP: CPT | Performed by: NEUROLOGICAL SURGERY

## 2024-11-12 ASSESSMENT — PAIN SCALES - GENERAL: PAINLEVEL_OUTOF10: 0-NO PAIN

## 2024-11-12 NOTE — LETTER
November 12, 2024     Patient: Junior Fontanez   YOB: 1965   Date of Visit: 11/12/2024       To Whom It May Concern:    Junior Fontanez was seen in my clinic on 11/12/2024 at 9:20 am. Please excuse Junior for his absence from work on this day to make the appointment.    If you have any questions or concerns, please don't hesitate to call.         Sincerely,         Ishmael Tracey MD        CC: No Recipients

## 2024-11-12 NOTE — LETTER
November 12, 2024     Patient: Junior Fontanez   YOB: 1965   Date of Visit: 11/12/2024       To Whom It May Concern:    It is my medical opinion that Junior Fontanez may return to work on 11/18/2024 with no restrictions .    If you have any questions or concerns, please don't hesitate to call.         Sincerely,        Ishmael Tracey MD    CC: No Recipients

## 2024-11-12 NOTE — PROGRESS NOTES
I just had the pleasure of seeing Mr. Fontanez back in the Neurosurgery Spine Clinic at the [facility]. He is a very pleasant 59 -year-old male, who recently underwent a C3-5 Laminoplasty with me on 9/30/2024 and is status post 6 weeks out from his surgery.     Mr. Fontanez is doing well from his surgery.  He mentions that he has noticed some improvement in his symptoms and denies any kind of worsening whatsoever.    Alert and oriented  No apparent distress.  Motor strength baseline with no new weakness.   Sensory exam grossly intact  Gait Stable.   Incision well healed.    AP and lateral X rays of the cervical spine shows well placed instrumentation with no evidence of any instrumentation failure.    There are no concerning neurological issues at this point.  He can return to work at this point with no restrictions.  Since he did not undergo any fusion there is no restriction in his neck motion either.  At his point of time, we will see him  back in clinic at 1 year s from the time of surgery  with an AP and lateral X rays of the cervical spine.  It was a pleasure to participate in Mr. Fontanez care.   All questions were answered to the patients satisfaction and he explained understanding of the further treatment plan.      Ishmael Tracey MD, [facility]   of Neurological Surgery  [facility] [employer] [employer]  Attending Surgeon  Director - Minimally Invasive Spine Surgery  [place], [place]      ---Some of this note was completed using Dragon voice recognition technology and sometimes the software misinterprets words. This may include unintended errors with respect to translation of words, typographical errors or grammar errors which may not have been identified prior to finalization of the chart note. Please take this into account when reading this note---

## 2024-11-12 NOTE — LETTER
November 12, 2024     Patient: Junior Fontanez   YOB: 1965   Date of Visit: 11/12/2024       To Whom It May Concern:    It is my medical opinion that Junior Fontanez {Work release (duty restriction):19783}.    If you have any questions or concerns, please don't hesitate to call.         Sincerely,        Ishmael Tracey MD    CC: No Recipients   We will increase his Cymbalta to 60 mg daily in the morning    We will increase the metoprolol to 50 mg each evening    Wait about 2 weeks then start the new medicine alendronate once weekly 30 minutes before you eat with 8 ounces of water for your bone density  Begin Tums 2 times daily one with 2 meals  Continue your vitamin D    See you back in 3 months or sooner if needed

## 2024-11-14 ENCOUNTER — APPOINTMENT (OUTPATIENT)
Dept: NEUROSURGERY | Facility: CLINIC | Age: 59
End: 2024-11-14
Payer: COMMERCIAL

## 2024-11-14 ENCOUNTER — TREATMENT (OUTPATIENT)
Dept: OCCUPATIONAL THERAPY | Facility: CLINIC | Age: 59
End: 2024-11-14
Payer: COMMERCIAL

## 2024-11-14 ENCOUNTER — TREATMENT (OUTPATIENT)
Dept: PHYSICAL THERAPY | Facility: CLINIC | Age: 59
End: 2024-11-14
Payer: COMMERCIAL

## 2024-11-14 DIAGNOSIS — M54.12 CERVICAL RADICULOPATHY: ICD-10-CM

## 2024-11-14 PROCEDURE — 97112 NEUROMUSCULAR REEDUCATION: CPT | Mod: GP,CQ

## 2024-11-14 PROCEDURE — 97110 THERAPEUTIC EXERCISES: CPT | Mod: GP,CQ

## 2024-11-14 PROCEDURE — 97110 THERAPEUTIC EXERCISES: CPT | Mod: GO | Performed by: OCCUPATIONAL THERAPIST

## 2024-11-14 ASSESSMENT — PAIN - FUNCTIONAL ASSESSMENT
PAIN_FUNCTIONAL_ASSESSMENT: 0-10
PAIN_FUNCTIONAL_ASSESSMENT: 0-10

## 2024-11-14 ASSESSMENT — PAIN SCALES - GENERAL
PAINLEVEL_OUTOF10: 0 - NO PAIN
PAINLEVEL_OUTOF10: 0 - NO PAIN

## 2024-11-14 NOTE — PROGRESS NOTES
"Physical Therapy Treatment    Patient Name: Junior Fontanez  MRN: [id number]  Encounter date: 11/14/2024 PT Received On: 11/14/24  Time Calculation  Start Time: 1330  Stop Time: 1410  Time Calculation (min): 40 min  PT Therapeutic Procedures Time Entry  Neuromuscular Re-Education Time Entry: 20  Therapeutic Exercise Time Entry: 20    Visit # 4 of 20+  Visits/Dates Authorized: 60  10/23/24: MMO - NO AUTH / OOP not met / $20 copay / 60V - 0 USED / AVAILITY [phone]    Current Problem:   Problem List Items Addressed This Visit             ICD-10-CM    Cervical radiculopathy M54.12         Surgery: s/p C3 laminectomy for cord decompression and C4-C5 laminoplasty with osteoplastic reconstruction of  the posterior elements using laminoplasty plates on 9/30/24  Surgery date: 9/30/24    Precautions:    Current post op restrictions in place:   Lifting restrictions: 8.5 lb  Modified cervical AROM - avoid end ranges  Not cleared to drive       Subjective   General:    Patient states he is doing well going back to work on Monday.  Saw surgeon no restrictions per MD return to work and use common sense  Pre-Treatment Symptoms:   Pain Assessment: 0-10  0-10 (Numeric) Pain Score: 0 - No pain    Objective   Findings:   Vitals:             Treatments:      Therapeutic Exercise 22310:        *2.5# ankle weight for proprio  Rec bike L4 6 min/airdyne 5mi. or elliptical L3 5min  Tband sidesteps green 2 laps  Tband monsters F/B- green 2 laps   Tband hip extension green 1x15 R/L   Gastrco/soleus stretches 15-20 sec x 1 ea  Shuttle hip ext 25# with legs, light UE support  Leg curl 50# 2x10    Deferred:  STS with 6# med ball /(cannot go above 8# yet)  HR x15, lean on counter TR's x15  Step ups F/L, 7in no UE x10 ea  Eccentric lowering/heel taps 3in, 1 UE x15, controlled unlock on R knee, controlled descent    Neuromuscular Re-Ed 64830:       *2.5# ankle weight RLE for proprio  Hurdles 6\" , F/L, F/B WS 2.5# ankle weight  Tandem gait foam " "x6   Foam side step x6 , then with 6\" hurdles x6 DNP  Tandem stance foam 30 sec x 2 R/L DNP  RB balance AP staggered rocks , normal stance balance / Lateral rocks and balance 1min ea   Modified SLS 7\" firm R only 30 sec x 3  STS on foam  feels clumsy but no LOB  Foam alt toe taps 7in step  Squats on incline board to chair  Modif SLS on foam and 7in step, reaching cross body, hip flexion taps  Carioaca/grape vine walk  Gait with 180/360 degree turns for coordination of RLE  Walk nods nos in rom that is safe for neck (allowed to move now)    Therapeutic Activity 61887:      Gait Training 54869:       Manual Therapy 82176:       Modalities:       HEP / Access Codes:   Isued HEP2GO sheet; copy in chart 11/1/24  Access Code: [email or web address]  Exercises  - Tandem Stance  - 2 x daily - 7 x weekly - 1-2 sets - 5 reps - 10-30 hold  - Single Leg Stance  - 2 x daily - 7 x weekly - 1-2 sets - 5 reps - 10-30 hold  - Seated Long Arc Quad  - 2 x daily - 7 x weekly - 3 sets - 10-15 reps  - Seated Hamstring Stretch  - 2 x daily - 7 x weekly - 1 sets - 3-5 reps - 10-30 hold  - Sit to Stand Without Arm Support  - 2 x daily - 7 x weekly - 1-2 sets - 10-15 reps    Assessment:    Patient slowly improving with balance activities,not stubbing toe anymore. Challenged with more coordinated movements. Per patient improvement on RB.  Patient is done with OT and will progress PT to include total body work    Post-Treatment Symptoms:    Response to Interventions: No change in pain     Plan:    Therapy options: will be seen for skilled physical therapy services  Planned modality interventions: electrical stimulation/Russian stimulation and TENS  Planned therapy interventions: balance/weight-bearing training, body mechanics training, functional ROM exercises, home exercise program, gait training, manual therapy, neuromuscular re-education, strengthening, stretching and soft tissue mobilization  Frequency: 2x week  Duration in visits: 10    Goals:   Active " "      PT Problem       Driving Goal       Start:  10/24/24    Expected End:  12/31/24       Pt will demonstrate at least 45 deg cervical rotation R/L to allow for ability to safely drive with minimal compensation from trunk         Leg Symmetry Goal       Start:  10/24/24    Expected End:  12/31/24       Pt will demonstrate strength of Right LE (hip/knee/ankle) that is symmetrical to Left to allow for smooth, fluid movements and minimize gait deviations and compensations         Gait Goal       Start:  10/24/24    Expected End:  12/31/24       Pt will demonstrate a normal gait pattern with consistent Right foot clearance and improved arm swing         Balance Goal       Start:  10/24/24    Expected End:  12/31/24       Pt will improve single leg balance without UE support from 3\" to 10\" to improve score on 4 Stage Balance Test thereby reducing falls risk               "

## 2024-11-14 NOTE — PROGRESS NOTES
"Occupational Therapy Treatment/Discharge Note  Patient Name: Junior Fontanez  MRN: [id number]  OT Received on: 11/14/2024        Time Calculation  Start Time: 1245  Stop Time: 1330  Time Calculation (min): 45 min  OT Therapeutic Procedures Time Entry  Therapeutic Exercise Time Entry: 45    Insurance:  Visit Number: 5 of 4  Insurance Type: MMO      Subjective   Problem List Items Addressed This Visit             ICD-10-CM    Cervical radiculopathy M54.12     Current Problem/Reason for visit:  59-year-old male who presents s/p C3 laminectomy for cord decompression and C4-C5 laminoplasty with osteoplastic reconstruction of  the posterior elements using laminoplasty plates on 9/30/24 due to progressively worsening over last several months; at yearly physical with PCP patient voiced concerns leading to work-up and ultimately above surgery.     Referred by: Dr. Osborn    Patient reports \"I saw my surgeon the other day and I can drive now and I am going back to work on Monday (11/18/24). This arm doesn't feel right yet.\"    Precautions: No restrictions    Performing HEP?: Partially; \"I don't do things every day, but I am doing a lot.\"    Pain:  Pain Assessment  Pain Assessment: 0-10  0-10 (Numeric) Pain Score: 0 - No pain    Objective     MMT MMT     Right Left   SHOULDER Flexion 4+/5 4+/5    Extension 5/5 5/5    Abduction 4+/5 4+/5    Internal Rotation 5/5 5/5    External Rotation 4/5 4/5     ELBOW Extension 4+/5 4+/5    Flexion 4+/5 4+/5     FOREARM Pronation 4+/5 4+/5    Supination 4+/5 4+/5     WRIST Flexion 5/5 5/5    Extension 5/5 5/5    Radial Deviation      Ulnar Deviation       Edema: N/a  Sensory: Grossly Intact  Numbness/Tingling: +Numbness/tingling throughout RUE      Treatment:    Therapeutic Exercise:  UBE standing x6 minutes with 3.0 resistance; 3 FW/3BW.  Shoulder shrugs with 5# free weights x10 reps x 3 sets.  BUE scaption in stance with 3# free weights x10 reps x 3 sets.  RUE retraction with 5# free weight " x10 reps x 3 sets.  BUE flies with 3# free weights x10 reps x 3 sets.  Functional [place]:  Lat Pulldowns (10#)  Straight arm pulldowns (7.5#)  Single arm pullbacks (10#)  Chest Press (7.5#)  Shoulder Flexion  Shoulder Extension (7.5#)  ER (5#)  IR (7.5#)  10 reps x 2 sets        Post-tx pain: 0/10    Assessment/Plan At this time, patient feels he is able to carryout HEP and progress strengthening on his own. Will proceed with discharge.      OP EDUCATION:  Education  Individual(s) Educated: Patient  Home Program: Fine motor tasks, Strengthening    Goals:  Active       OT Goals       1. Patient will increase R hand  strength by at least 15# for improved functional use of dominant hand. (Progressing)       Start:  10/17/24    Expected End:  11/07/24         Goal Note       +4# since evaluation              2. Patient will improve R hand coordination AEB Standardized Test scores by 20% for improved functional use of dominant hand. (Progressing)       Start:  10/17/24    Expected End:  11/07/24         Goal Note       9 Hole Peg Test:         R= 26.3    [place]:                 R= 1:03                3. Patient will increase RUE strength to 4+/5 grossly at all planes for improved functional use of dominant hand. (Met)       Start:  10/17/24    Expected End:  11/14/24    Resolved:  11/14/24         4. Patient will increase overall functional ease and independence AEB UEFI score of at least 70/80 for improved quality of life for patient. (Progressing)       Start:  10/17/24    Expected End:  11/14/24         Goal Note       59/80; 26% impaired                   room air

## 2024-11-21 ENCOUNTER — TREATMENT (OUTPATIENT)
Dept: PHYSICAL THERAPY | Facility: CLINIC | Age: 59
End: 2024-11-21
Payer: COMMERCIAL

## 2024-11-21 DIAGNOSIS — M54.12 CERVICAL RADICULOPATHY: ICD-10-CM

## 2024-11-21 PROCEDURE — 97112 NEUROMUSCULAR REEDUCATION: CPT | Mod: GP | Performed by: PHYSICAL THERAPIST

## 2024-11-21 PROCEDURE — 97110 THERAPEUTIC EXERCISES: CPT | Mod: GP | Performed by: PHYSICAL THERAPIST

## 2024-11-21 NOTE — PROGRESS NOTES
Physical Therapy Treatment    Patient Name: Junior Fontanez  MRN: [id number]  Encounter date: 11/21/2024    Time Calculation  Start Time: 0458  Stop Time: 0548  Time Calculation (min): 50 min  PT Therapeutic Procedures Time Entry  Neuromuscular Re-Education Time Entry: 15  Therapeutic Exercise Time Entry: 35    Visit # 5 of 20+  Visits/Dates Authorized: 60  10/23/24: MMO - NO AUTH / OOP not met / $20 copay / 60V - 0 USED / AVAILITY [phone]    Current Problem:   Problem List Items Addressed This Visit             ICD-10-CM    Cervical radiculopathy M54.12           Surgery: s/p C3 laminectomy for cord decompression and C4-C5 laminoplasty with osteoplastic reconstruction of  the posterior elements using laminoplasty plates on 9/30/24  Surgery date: 9/30/24    Precautions:    Current post op restrictions in place:   Lifting restrictions: 8.5 lb  Modified cervical AROM - avoid end ranges  Not cleared to drive       Subjective   General:    Patient states he is doing well going back to work on Monday.  Saw surgeon no restrictions per MD return to work and use common sense  Pre-Treatment Symptoms:        Objective   Findings:   Vitals:             Treatments:      Therapeutic Exercise 85288:          elliptical L3 5min     3 circuits  with 2 rounds of each circuit  *2.5# ankle weight for proprioception    FT resisted walk 7.5# with hurdles side stepping        FT unilateral tall kneel lat pulldown 10# 15 x         Shuttle hip ext 25# with legs, light UE support    2.   Foam alt toe taps 7in step         Squats on incline board to high angelica table        Chest Press (7.5#) 15 x 2    3.    Modif SLS reaching cross body, green jose alejandro pad reach to  foam rollers on L/R         HR x15, lean on counter TR's x15         ER (5#)10 x2         IR (7.5#) 10 2    Deferred:  Rec bike L4 6 min/airdyne 5mi. or   Tband sidesteps green 2 laps  Tband monsters F/B- green 2 laps   Tband hip extension green 1x15 R/L   Gastrco/soleus  "stretches 15-20 sec x 1 ea  Leg curl 50# 2x10    From OT:  Lat Pulldowns (10#)  Straight arm pulldowns (7.5#)  Single arm pullbacks (10#)  Shoulder Flexion  Shoulder Extension (7.5#)    STS with 6# med ball /(cannot go above 8# yet)  Step ups F/L, 7in no UE x10 ea  Eccentric lowering/heel taps 3in, 1 UE x15, controlled unlock on R knee, controlled descent    Neuromuscular Re-Ed 49148:       *2.5# ankle weight RLE for proprio  Hurdles 6\" , F/L, F/B WS 2.5# ankle weight  Tandem gait foam x6   Foam side step x6 , then with 6\" hurdles x6 DNP  Tandem stance foam 30 sec x 2 R/L DNP  RB balance AP staggered rocks , normal stance balance / Lateral rocks and balance 1min ea   Modified SLS 7\" firm R only 30 sec x 3  STS on foam  feels clumsy but no LOB  Carioaca/grape vine walk  Gait with 180/360 degree turns for coordination of RLE  Walk nods nos in rom that is safe for neck (allowed to move now)    Therapeutic Activity 52747:      Gait Training 12654:       Manual Therapy 34181:       Modalities:       HEP / Access Codes:   Isued HEP2GO sheet; copy in chart 11/1/24  Access Code: [email or web address]  Exercises  - Tandem Stance  - 2 x daily - 7 x weekly - 1-2 sets - 5 reps - 10-30 hold  - Single Leg Stance  - 2 x daily - 7 x weekly - 1-2 sets - 5 reps - 10-30 hold  - Seated Long Arc Quad  - 2 x daily - 7 x weekly - 3 sets - 10-15 reps  - Seated Hamstring Stretch  - 2 x daily - 7 x weekly - 1 sets - 3-5 reps - 10-30 hold  - Sit to Stand Without Arm Support  - 2 x daily - 7 x weekly - 1-2 sets - 10-15 reps    Assessment:    Pt performed combo circuits with UE/LE and balance activity in each grouping. Fatigued by session, but tolerated well    Post-Treatment Symptoms:          Plan: Continue with POC   Therapy options: will be seen for skilled physical therapy services  Planned modality interventions: electrical stimulation/Russian stimulation and TENS  Planned therapy interventions: balance/weight-bearing training, body mechanics " "training, functional ROM exercises, home exercise program, gait training, manual therapy, neuromuscular re-education, strengthening, stretching and soft tissue mobilization  Frequency: 2x week  Duration in visits: 10    Goals:   Active       PT Problem       Driving Goal       Start:  10/24/24    Expected End:  12/31/24       Pt will demonstrate at least 45 deg cervical rotation R/L to allow for ability to safely drive with minimal compensation from trunk         Leg Symmetry Goal       Start:  10/24/24    Expected End:  12/31/24       Pt will demonstrate strength of Right LE (hip/knee/ankle) that is symmetrical to Left to allow for smooth, fluid movements and minimize gait deviations and compensations         Gait Goal       Start:  10/24/24    Expected End:  12/31/24       Pt will demonstrate a normal gait pattern with consistent Right foot clearance and improved arm swing         Balance Goal       Start:  10/24/24    Expected End:  12/31/24       Pt will improve single leg balance without UE support from 3\" to 10\" to improve score on 4 Stage Balance Test thereby reducing falls risk                 "

## 2024-11-27 ENCOUNTER — TREATMENT (OUTPATIENT)
Dept: PHYSICAL THERAPY | Facility: CLINIC | Age: 59
End: 2024-11-27
Payer: COMMERCIAL

## 2024-11-27 DIAGNOSIS — M54.12 CERVICAL RADICULOPATHY: ICD-10-CM

## 2024-11-27 PROCEDURE — 97110 THERAPEUTIC EXERCISES: CPT | Mod: GP | Performed by: PHYSICAL THERAPIST

## 2024-11-27 PROCEDURE — 97112 NEUROMUSCULAR REEDUCATION: CPT | Mod: GP | Performed by: PHYSICAL THERAPIST

## 2024-11-27 ASSESSMENT — PAIN SCALES - GENERAL: PAINLEVEL_OUTOF10: 0 - NO PAIN

## 2024-11-27 ASSESSMENT — PAIN - FUNCTIONAL ASSESSMENT: PAIN_FUNCTIONAL_ASSESSMENT: 0-10

## 2024-11-27 NOTE — PROGRESS NOTES
"Physical Therapy Treatment    Patient Name: Junior Fontanez  MRN: [id number]  Encounter date: 11/27/2024 PT Received On: 11/27/24  Time Calculation  Start Time: 1611  Stop Time: 1700  Time Calculation (min): 49 min  PT Therapeutic Procedures Time Entry  Neuromuscular Re-Education Time Entry: 20  Therapeutic Exercise Time Entry: 26    Visit # 6 of 20+  Visits/Dates Authorized: 60  10/23/24: MMO - NO AUTH / OOP not met / $20 copay / 60V - 0 USED / AVAILITY [phone]    Current Problem:   Problem List Items Addressed This Visit             ICD-10-CM    Cervical radiculopathy M54.12       Surgery: s/p C3 laminectomy for cord decompression and C4-C5 laminoplasty with osteoplastic reconstruction of  the posterior elements using laminoplasty plates on 9/30/24  Surgery date: 9/30/24    Precautions:    Current post op restrictions in place:   Lifting restrictions: 8.5 lb  Modified cervical AROM - avoid end ranges  Not cleared to drive       Subjective   General:    Patient states he was extremely sore after last PT session circuit, \"sore for days.\" Neck gets sore and tired holding his head up all day at work./     Pre-Treatment Symptoms:   Pain Assessment: 0-10  0-10 (Numeric) Pain Score: 0 - No pain  Just tired from work    Objective   Findings:   Vitals:   WFL        Treatments:      Therapeutic Exercise 99010:          elliptical L3 5min    *2.5# ankle weight for proprioception    11/27/24 DNP circuit, performed certain ther ex within the circuit but not in circuit form   3 circuits  with 2 rounds of each circuit  FT resisted walk 7.5# with hurdles side stepping        FT unilateral tall kneel lat pulldown 10# 15 x         Shuttle hip ext 25# with legs, light UE support- 11/27 did 12.5# 2/2 LBP    2.   Foam alt toe taps 7in step         Squats on incline board to high angelica table        Chest Press (7.5#) 15 x 2        Shuttle press 87.5-137.5# sets of x12    3.    Modif SLS reaching cross body, green jose alejandro pad reach to  " "foam rollers on L/R         HR x15, lean on counter TR's x15         ER (5#)10 x2         IR (7.5#) 10 2  Quadruped cervical retractions and rotations  Quadruped scapular prot/ret- minimal scap mvmt, lacks control    Deferred:  Rec bike L4 6 min/airdyne 5mi. or   Tband sidesteps green 2 laps  Tband monsters F/B- green 2 laps   Tband hip extension green 1x15 R/L   Gastrco/soleus stretches 15-20 sec x 1 ea  Leg curl 50# 2x10    From OT:  Lat Pulldowns (10#)  Straight arm pulldowns (7.5#)  Single arm pullbacks (10#)  Shoulder Flexion  Shoulder Extension (7.5#)  Wall angels  Open books  T's- scapular winging    STS with 6# med ball /(cannot go above 8# yet)  Step ups F/L, 7in no UE x10 ea  Eccentric lowering/heel taps 3in, 1 UE x15, controlled unlock on R knee, controlled descent    Neuromuscular Re-Ed 67894:       *2.5# ankle weight RLE for proprio  Hurdles 12\" , F/L, F/B WS 2.5# ankle weight  Tandem gait foam x6   Foam side step x6 , then with 6-12\" hurdles x6   Tandem stance foam 30 sec x 2 R/L DNP  RB balance AP staggered rocks , normal stance balance / Lateral rocks and balance 1min ea   Modified SLS 7\" firm R only 30 sec x 3  STS on foam  feels clumsy but no LOB  Carioaca/grape vine walk  Gait with 180/360 degree turns for coordination of RLE  Walk nods nos in rom that is safe for neck (allowed to move now)    Therapeutic Activity 70855: dnp      Gait Training 35787: dnp      Manual Therapy 18310: dnp      Modalities: dnp      HEP / Access Codes:   Isued HEP2GO sheet; copy in chart 11/1/24  Access Code: [email or web address]  Exercises  - Tandem Stance  - 2 x daily - 7 x weekly - 1-2 sets - 5 reps - 10-30 hold  - Single Leg Stance  - 2 x daily - 7 x weekly - 1-2 sets - 5 reps - 10-30 hold  - Seated Long Arc Quad  - 2 x daily - 7 x weekly - 3 sets - 10-15 reps  - Seated Hamstring Stretch  - 2 x daily - 7 x weekly - 1 sets - 3-5 reps - 10-30 hold  - Sit to Stand Without Arm Support  - 2 x daily - 7 x weekly - 1-2 sets - 10-15 " "reps    Assessment:    Pt performed combo of balance/coordination and strength with UE/LE and balance activity in each grouping. Fatigued by session, but tolerated well. Pt did well with RLE toe clearance.     Post-Treatment Symptoms:    Response to Interventions: No change in pain     Plan: Continue with POC to progress RLE balance and coordination, strength and UE strength within post-op protocol.    Therapy options: will be seen for skilled physical therapy services  Planned modality interventions: electrical stimulation/Russian stimulation and TENS  Planned therapy interventions: balance/weight-bearing training, body mechanics training, functional ROM exercises, home exercise program, gait training, manual therapy, neuromuscular re-education, strengthening, stretching and soft tissue mobilization  Frequency: 2x week  Duration in visits: 10+    Goals:   Active       PT Problem       Driving Goal       Start:  10/24/24    Expected End:  12/31/24       Pt will demonstrate at least 45 deg cervical rotation R/L to allow for ability to safely drive with minimal compensation from trunk         Leg Symmetry Goal       Start:  10/24/24    Expected End:  12/31/24       Pt will demonstrate strength of Right LE (hip/knee/ankle) that is symmetrical to Left to allow for smooth, fluid movements and minimize gait deviations and compensations         Gait Goal       Start:  10/24/24    Expected End:  12/31/24       Pt will demonstrate a normal gait pattern with consistent Right foot clearance and improved arm swing         Balance Goal       Start:  10/24/24    Expected End:  12/31/24       Pt will improve single leg balance without UE support from 3\" to 10\" to improve score on 4 Stage Balance Test thereby reducing falls risk                 "

## 2024-12-05 ENCOUNTER — TREATMENT (OUTPATIENT)
Dept: PHYSICAL THERAPY | Facility: CLINIC | Age: 59
End: 2024-12-05
Payer: COMMERCIAL

## 2024-12-05 DIAGNOSIS — M54.12 CERVICAL RADICULOPATHY: ICD-10-CM

## 2024-12-05 PROCEDURE — 97110 THERAPEUTIC EXERCISES: CPT | Mod: GP

## 2024-12-05 PROCEDURE — 97112 NEUROMUSCULAR REEDUCATION: CPT | Mod: GP

## 2024-12-05 ASSESSMENT — PAIN - FUNCTIONAL ASSESSMENT: PAIN_FUNCTIONAL_ASSESSMENT: 0-10

## 2024-12-05 ASSESSMENT — PAIN SCALES - GENERAL: PAINLEVEL_OUTOF10: 0 - NO PAIN

## 2024-12-05 NOTE — PROGRESS NOTES
Physical Therapy Treatment    Patient Name: Junior Fontanez  MRN: [id number]  Encounter date: 12/5/2024    Time Calculation  Start Time: 1625  Stop Time: 1710  Time Calculation (min): 45 min  PT Therapeutic Procedures Time Entry  Neuromuscular Re-Education Time Entry: 15  Therapeutic Exercise Time Entry: 25    Visit # 7 of 20+  Visits/Dates Authorized: 60  10/23/24: MMO - NO AUTH / OOP not met / $20 copay / 60V - 0 USED / AVAILITY [id number]    Current Problem:   Problem List Items Addressed This Visit             ICD-10-CM    Cervical radiculopathy M54.12       Surgery: s/p C3 laminectomy for cord decompression and C4-C5 laminoplasty with osteoplastic reconstruction of  the posterior elements using laminoplasty plates on 9/30/24  Surgery date: 9/30/24    Precautions:    Current post op restrictions in place:   Lifting restrictions: 8.5 lb  Modified cervical AROM - avoid end ranges  Not cleared to drive       Subjective   General:    Pt states his R hand is still numb and weak. RLE is weak and clumsy.     Pre-Treatment Symptoms:   Pain Assessment: 0-10  0-10 (Numeric) Pain Score: 0 - No pain  Just tired from work    Objective   Findings:      SLS: R: 9 seconds L: 15 seconds     Treatments:      Therapeutic Exercise 33634:          Rec bike L4 6 min  Sit to stand w/ FT row 7.5# 2x10  DLS foam w/ weighted wand bicep curl x 15, shoulder flexion x 10, overhead press x 10 9#  Sit to stand w/ overhead medicine ball press 6# 2x10  Step ups RLE fwd/lateral/rev x5 each  Heel raise 2x10  Wall lean ankle DF x 15  Heel raise side step 10 feet x4 L/R    Deferred:  airdyne   Tband sidesteps green 2 laps  Tband monsters F/B- green 2 laps   Tband hip extension green 1x15 R/L   Gastrco/soleus stretches 15-20 sec x 1 ea  Leg curl 50# 2x10  elliptical L3 5min    *2.5# ankle weight for proprioception    11/27/24 DNP circuit, performed certain ther ex within the circuit but not in circuit form   3 circuits  with 2 rounds of each  "circuit  FT resisted walk 7.5# with hurdles side stepping        FT unilateral tall kneel lat pulldown 10# 15 x         Shuttle hip ext 25# with legs, light UE support- 11/27 did 12.5# 2/2 LBP    2.   Foam alt toe taps 7in step         Squats on incline board to high angelica table        Chest Press (7.5#) 15 x 2        Shuttle press 87.5-137.5# sets of x12    3.    Modif SLS reaching cross body, green jose alejandro pad reach to  foam rollers on L/R         HR x15, lean on counter TR's x15         ER (5#)10 x2         IR (7.5#) 10 2  Quadruped cervical retractions and rotations  Quadruped scapular prot/ret- minimal scap mvmt, lacks control  From OT:  Lat Pulldowns (10#)  Straight arm pulldowns (7.5#)  Single arm pullbacks (10#)  Shoulder Flexion  Shoulder Extension (7.5#)  Wall angels  Open books  T's- scapular winging    STS with 6# med ball /(cannot go above 8# yet)  Step ups F/L, 7in no UE x10 ea  Eccentric lowering/heel taps 3in, 1 UE x15, controlled unlock on R knee, controlled descent    Neuromuscular Re-Ed 11141:         Hurdles 6\" , fwd/lateral x 5 each no UE support  Resisted gait lateral 10# x 4 L/R  RB balance AP w/ arm raise x 10, ML w/ lateral raise x 10  SLS L/R  DLS foam EO/EC/ EC w/ head turns L/R  Toe taps 5 inch x 20  Median nerve glides x 15 RUE    HEP / Access Codes:   Isued HEP2GO sheet; copy in chart 11/1/24  Access Code: [email or web address]  Exercises  - Tandem Stance  - 2 x daily - 7 x weekly - 1-2 sets - 5 reps - 10-30 hold  - Single Leg Stance  - 2 x daily - 7 x weekly - 1-2 sets - 5 reps - 10-30 hold  - Seated Long Arc Quad  - 2 x daily - 7 x weekly - 3 sets - 10-15 reps  - Seated Hamstring Stretch  - 2 x daily - 7 x weekly - 1 sets - 3-5 reps - 10-30 hold  - Sit to Stand Without Arm Support  - 2 x daily - 7 x weekly - 1-2 sets - 10-15 reps    Assessment:    Pt continues to work on UE/LE strengthening and coordination/balance drills to improve his function. Pt still weak in his R hand/LE. Pt back to work and " "driving now. Did better this visit with single leg balance.     Post-Treatment Symptoms:    Response to Interventions: No change in pain     Plan: Continue with POC to progress RLE balance and coordination, strength and UE strength within post-op protocol.    Therapy options: will be seen for skilled physical therapy services  Planned modality interventions: electrical stimulation/Russian stimulation and TENS  Planned therapy interventions: balance/weight-bearing training, body mechanics training, functional ROM exercises, home exercise program, gait training, manual therapy, neuromuscular re-education, strengthening, stretching and soft tissue mobilization  Frequency: 2x week  Duration in visits: 10+    Goals:   Active       PT Problem       Driving Goal       Start:  10/24/24    Expected End:  12/31/24       Pt will demonstrate at least 45 deg cervical rotation R/L to allow for ability to safely drive with minimal compensation from trunk         Leg Symmetry Goal       Start:  10/24/24    Expected End:  12/31/24       Pt will demonstrate strength of Right LE (hip/knee/ankle) that is symmetrical to Left to allow for smooth, fluid movements and minimize gait deviations and compensations         Gait Goal       Start:  10/24/24    Expected End:  12/31/24       Pt will demonstrate a normal gait pattern with consistent Right foot clearance and improved arm swing         Balance Goal       Start:  10/24/24    Expected End:  12/31/24       Pt will improve single leg balance without UE support from 3\" to 10\" to improve score on 4 Stage Balance Test thereby reducing falls risk                 "

## 2024-12-11 ENCOUNTER — TREATMENT (OUTPATIENT)
Dept: PHYSICAL THERAPY | Facility: CLINIC | Age: 59
End: 2024-12-11
Payer: COMMERCIAL

## 2024-12-11 DIAGNOSIS — M54.12 CERVICAL RADICULOPATHY: ICD-10-CM

## 2024-12-11 PROCEDURE — 97110 THERAPEUTIC EXERCISES: CPT | Mod: GP | Performed by: PHYSICAL THERAPIST

## 2024-12-11 NOTE — PROGRESS NOTES
"Physical Therapy Treatment    Patient Name: Junior Fontanez  MRN: [id number]  Encounter date: 12/11/2024    Time Calculation  Start Time: 1700  Stop Time: 1745  Time Calculation (min): 45 min  PT Therapeutic Procedures Time Entry  Therapeutic Exercise Time Entry: 43    Visit # 8 of 20+  Visits/Dates Authorized: 60  10/23/24: MMO - NO AUTH / OOP not met / $20 copay / 60V - 0 USED / AVAILITY [phone]    Current Problem:   Problem List Items Addressed This Visit             ICD-10-CM    Cervical radiculopathy M54.12       Surgery: s/p C3 laminectomy for cord decompression and C4-C5 laminoplasty with osteoplastic reconstruction of  the posterior elements using laminoplasty plates on 9/30/24  Surgery date: 9/30/24    Precautions: 12/11/24 no longer under precautions, \"MD said use common sense.\"  2mos:   Current post op restrictions in place:   Lifting restrictions: 8.5 lb  Modified cervical AROM - avoid end ranges       Subjective   General:    Pt states his R hand is still numb and weak. RLE is weak and clumsy. Not having pain, just the weakness and numbness in RUE is the most aggravating thing. RLE is not as numb or weak.     Pre-Treatment Symptoms:      Just tired from work    Objective   Findings:      SLS: R: 9 seconds L: 15 seconds     Treatments:      Therapeutic Exercise 85798:          elliptical L3-4 5min  Sit to stand w/ FT row 7.5# 2x10  DLS foam w/ weighted wand bicep curl x 15, shoulder flexion x 10, overhead press x 10 9#  Sit to stand w/ overhead medicine ball press 6# 2x10, STS with 10-18# 2x12- low bench  Step ups RLE 7in fwd/lateral/rev x5 each  Heel raise 2x10  Wall lean ankle DF x 15  Heel raise side step 10 feet x4 L/R  Tband sidesteps green 2 laps  Tband monsters F/B- green 2 laps   Chest press 7.5-10#, lat pull down 15#, lawnmower pull/rows 10#: 2x10 for all  Shuttle hip ext 12.5-25# 2x12  Weighted step ups 1 UE assist, 10#, 7in step  Wall slide 6# shoulder press using wall- RUE only " "2x10    Deferred:  airdyne   Tband hip extension green 1x15 R/L   Gastrco/soleus stretches 15-20 sec x 1 ea  Leg curl 50# 2x10      *2.5# ankle weight for proprioception    11/27/24 DNP circuit, performed certain ther ex within the circuit but not in circuit form   3 circuits  with 2 rounds of each circuit  FT resisted walk 7.5# with hurdles side stepping        FT unilateral tall kneel lat pulldown 10# 15 x         Shuttle hip ext 25# with legs, light UE support- 11/27 did 12.5# 2/2 LBP    2.   Foam alt toe taps 7in step         Squats on incline board to high angelica table        Chest Press (7.5#) 15 x 2        Shuttle press 87.5-137.5# sets of x12    3.    Modif SLS reaching cross body, green jose alejandro pad reach to  foam rollers on L/R         HR x15, lean on counter TR's x15         ER (5#)10 x2         IR (7.5#) 10 2  Quadruped cervical retractions and rotations  Quadruped scapular prot/ret- minimal scap mvmt, lacks control  From OT:  Lat Pulldowns (10#)  Straight arm pulldowns (7.5#)  Single arm pullbacks (10#)  Shoulder Flexion  Shoulder Extension (7.5#)  Wall angels  Open books  T's- scapular winging    STS with 6# med ball /(cannot go above 8# yet)  Step ups F/L, 7in no UE x10 ea  Eccentric lowering/heel taps 3in, 1 UE x15, controlled unlock on R knee, controlled descent    Neuromuscular Re-Ed 08981:         Hurdles 6\" , fwd/lateral x 5 each no UE support  Resisted gait lateral 10# x 4 L/R  RB balance AP w/ arm raise x 10, ML w/ lateral raise x 10  SLS L/R  DLS foam EO/EC/ EC w/ head turns L/R  Toe taps 5 inch x 20  Median nerve glides x 15 RUE    HEP / Access Codes:   Isued HEP2GO sheet; copy in chart 11/1/24  Access Code: [email or web address]  Exercises  - Tandem Stance  - 2 x daily - 7 x weekly - 1-2 sets - 5 reps - 10-30 hold  - Single Leg Stance  - 2 x daily - 7 x weekly - 1-2 sets - 5 reps - 10-30 hold  - Seated Long Arc Quad  - 2 x daily - 7 x weekly - 3 sets - 10-15 reps  - Seated Hamstring Stretch  - 2 x daily - 7 x " "weekly - 1 sets - 3-5 reps - 10-30 hold  - Sit to Stand Without Arm Support  - 2 x daily - 7 x weekly - 1-2 sets - 10-15 reps    Assessment:    Pt continues to work on UE/LE strengthening and coordination/balance drills to improve his function. Pt still weak in his R hand/LE. Pt back to work and driving now. Did better this visit with single leg balance.     Post-Treatment Symptoms:          Plan: Continue with POC to progress RLE balance and coordination, strength and UE strength within post-op protocol.    Therapy options: will be seen for skilled physical therapy services  Planned modality interventions: electrical stimulation/Russian stimulation and TENS  Planned therapy interventions: balance/weight-bearing training, body mechanics training, functional ROM exercises, home exercise program, gait training, manual therapy, neuromuscular re-education, strengthening, stretching and soft tissue mobilization  Frequency: 2x week  Duration in visits: 10+    Goals:   Active       PT Problem       Driving Goal       Start:  10/24/24    Expected End:  12/31/24       Pt will demonstrate at least 45 deg cervical rotation R/L to allow for ability to safely drive with minimal compensation from trunk         Leg Symmetry Goal       Start:  10/24/24    Expected End:  12/31/24       Pt will demonstrate strength of Right LE (hip/knee/ankle) that is symmetrical to Left to allow for smooth, fluid movements and minimize gait deviations and compensations         Gait Goal       Start:  10/24/24    Expected End:  12/31/24       Pt will demonstrate a normal gait pattern with consistent Right foot clearance and improved arm swing         Balance Goal       Start:  10/24/24    Expected End:  12/31/24       Pt will improve single leg balance without UE support from 3\" to 10\" to improve score on 4 Stage Balance Test thereby reducing falls risk                 "

## 2024-12-18 ENCOUNTER — TREATMENT (OUTPATIENT)
Dept: PHYSICAL THERAPY | Facility: CLINIC | Age: 59
End: 2024-12-18
Payer: COMMERCIAL

## 2024-12-18 DIAGNOSIS — M54.12 CERVICAL RADICULOPATHY: ICD-10-CM

## 2024-12-18 PROCEDURE — 97110 THERAPEUTIC EXERCISES: CPT | Mod: GP | Performed by: PHYSICAL THERAPIST

## 2024-12-18 ASSESSMENT — PAIN SCALES - GENERAL: PAINLEVEL_OUTOF10: 0 - NO PAIN

## 2024-12-18 ASSESSMENT — PAIN - FUNCTIONAL ASSESSMENT: PAIN_FUNCTIONAL_ASSESSMENT: 0-10

## 2024-12-18 NOTE — PROGRESS NOTES
"Physical Therapy Treatment    Patient Name: Junior Fontanez  MRN: [id number]  Encounter date: 12/18/2024 PT Received On: 12/18/24  Time Calculation  Start Time: 1700  Stop Time: 1746  Time Calculation (min): 46 min  PT Therapeutic Procedures Time Entry  Therapeutic Exercise Time Entry: 45    Visit # 9 of 20+  Visits/Dates Authorized: 60  10/23/24: MMO - NO AUTH / OOP not met / $20 copay / 60V - 0 USED / AVAILITY [phone]    Current Problem:   Problem List Items Addressed This Visit             ICD-10-CM    Cervical radiculopathy M54.12       Surgery: s/p C3 laminectomy for cord decompression and C4-C5 laminoplasty with osteoplastic reconstruction of  the posterior elements using laminoplasty plates on 9/30/24  Surgery date: 9/30/24    Precautions: 12/11/24 no longer under precautions, \"MD said use common sense.\"  2mos:   Current post op restrictions in place:   Lifting restrictions: 8.5 lb  Modified cervical AROM - avoid end ranges       Subjective   General:    Pt states his R hand is still numb and weak. RLE is weak and clumsy. Not having pain, just the weakness and numbness in RUE is the most aggravating thing. RLE is not as numb or weak. Feels fatigue in his neck, after a long day his head feels heavy and it is hard to hold his head up in neutral posture.    Pre-Treatment Symptoms:   Pain Assessment: 0-10  0-10 (Numeric) Pain Score: 0 - No pain  Just tired from work    Objective   Findings:      SLS: R: 9 seconds L: 15 seconds     Treatments:      Therapeutic Exercise 18145:      elliptical L3-4 5min  Sit to stand w/ FT row 7.5# 2x10  DLS foam w/ weighted wand bicep curl x 15, shoulder flexion x 10, overhead press x 10 9#  Sit to stand w/ overhead medicine ball press 6# 2x10, STS with 10-18# 2x12- low bench  Step ups RLE 7in fwd/lateral/rev x5 each  Heel raise 2x10  Wall lean ankle DF x 15  Heel raise side step 10 feet x4 L/R  Tband sidesteps green 2 laps  Tband monsters F/B- green 2 laps   Chest press " "7.5-10#, lat pull down 15-20#, lawnmower pull/rows 10#: 2x10 for all  Shuttle hip ext 12.5-25# 2x12  Weighted step ups 1 UE assist, 10#, 7in step  Wall slide 6# shoulder press using wall- RUE only 2x10  Reverse flies 5#, horiz abd 5#, low to high Y's 5#      Deferred:  airdyne   Tband hip extension green 1x15 R/L   Gastrco/soleus stretches 15-20 sec x 1 ea  Leg curl 50# 2x10      *2.5# ankle weight for proprioception    11/27/24 DNP circuit, performed certain ther ex within the circuit but not in circuit form   3 circuits  with 2 rounds of each circuit  FT resisted walk 7.5# with hurdles side stepping        FT unilateral tall kneel lat pulldown 10# 15 x         Shuttle hip ext 25# with legs, light UE support- 11/27 did 12.5# 2/2 LBP    2.   Foam alt toe taps 7in step         Squats on incline board to high angelica table        Chest Press (7.5#) 15 x 2        Shuttle press 87.5-137.5# sets of x12    3.    Modif SLS reaching cross body, green jose alejandro pad reach to  foam rollers on L/R         HR x15, lean on counter TR's x15         ER (5#)10 x2         IR (7.5#) 10 2  Quadruped cervical retractions and rotations  Quadruped scapular prot/ret- minimal scap mvmt, lacks control  From OT:  Lat Pulldowns (10#)  Straight arm pulldowns (7.5#)  Single arm pullbacks (10#)  Shoulder Flexion  Shoulder Extension (7.5#)  Wall angels  Open books  T's- scapular winging    STS with 6# med ball /(cannot go above 8# yet)  Step ups F/L, 7in no UE x10 ea  Eccentric lowering/heel taps 3in, 1 UE x15, controlled unlock on R knee, controlled descent    Neuromuscular Re-Ed 75462:         Hurdles 6\" , fwd/lateral x 5 each no UE support  Resisted gait lateral 10# x 4 L/R  RB balance AP w/ arm raise x 10, ML w/ lateral raise x 10  SLS L/R  DLS foam EO/EC/ EC w/ head turns L/R  Toe taps 5 inch x 20  Median nerve glides x 15 RUE    HEP / Access Codes:   Isued HEP2GO sheet; copy in chart 11/1/24  Access Code: [email or web address]  Exercises  - Tandem Stance  - 2 x " daily - 7 x weekly - 1-2 sets - 5 reps - 10-30 hold  - Single Leg Stance  - 2 x daily - 7 x weekly - 1-2 sets - 5 reps - 10-30 hold  - Seated Long Arc Quad  - 2 x daily - 7 x weekly - 3 sets - 10-15 reps  - Seated Hamstring Stretch  - 2 x daily - 7 x weekly - 1 sets - 3-5 reps - 10-30 hold  - Sit to Stand Without Arm Support  - 2 x daily - 7 x weekly - 1-2 sets - 10-15 reps    Assessment:   PT Assessment  PT Assessment Results: Decreased strength, Decreased range of motion, Decreased endurance, Impaired balance, Decreased mobility, Decreased coordination, Orthopedic restrictionsPt continues to work on UE/LE strengthening and coordination/balance drills to improve his function. Pt still weak in his R hand/LE. Pt back to work and driving now. Did better this visit with single leg balance.     Post-Treatment Symptoms:    Response to Interventions: No change in pain     Plan: Continue with POC to progress RLE balance and coordination, strength and UE strength within post-op protocol.    Therapy options: will be seen for skilled physical therapy services  Planned modality interventions: electrical stimulation/Russian stimulation and TENS  Planned therapy interventions: balance/weight-bearing training, body mechanics training, functional ROM exercises, home exercise program, gait training, manual therapy, neuromuscular re-education, strengthening, stretching and soft tissue mobilization  Frequency: 2x week  Duration in visits: 10+    Goals:   Active       PT Problem       Driving Goal       Start:  10/24/24    Expected End:  12/31/24       Pt will demonstrate at least 45 deg cervical rotation R/L to allow for ability to safely drive with minimal compensation from trunk         Leg Symmetry Goal       Start:  10/24/24    Expected End:  12/31/24       Pt will demonstrate strength of Right LE (hip/knee/ankle) that is symmetrical to Left to allow for smooth, fluid movements and minimize gait deviations and compensations     "     Gait Goal       Start:  10/24/24    Expected End:  12/31/24       Pt will demonstrate a normal gait pattern with consistent Right foot clearance and improved arm swing         Balance Goal       Start:  10/24/24    Expected End:  12/31/24       Pt will improve single leg balance without UE support from 3\" to 10\" to improve score on 4 Stage Balance Test thereby reducing falls risk                 "

## 2024-12-26 ENCOUNTER — APPOINTMENT (OUTPATIENT)
Dept: PHYSICAL THERAPY | Facility: CLINIC | Age: 59
End: 2024-12-26
Payer: COMMERCIAL

## 2024-12-27 ENCOUNTER — TREATMENT (OUTPATIENT)
Dept: PHYSICAL THERAPY | Facility: CLINIC | Age: 59
End: 2024-12-27
Payer: COMMERCIAL

## 2024-12-27 DIAGNOSIS — M54.12 CERVICAL RADICULOPATHY: ICD-10-CM

## 2024-12-27 PROCEDURE — 97110 THERAPEUTIC EXERCISES: CPT | Mod: GP | Performed by: PHYSICAL THERAPIST

## 2024-12-27 ASSESSMENT — PAIN - FUNCTIONAL ASSESSMENT: PAIN_FUNCTIONAL_ASSESSMENT: 0-10

## 2024-12-27 ASSESSMENT — PAIN SCALES - GENERAL: PAINLEVEL_OUTOF10: 0 - NO PAIN

## 2024-12-27 NOTE — PROGRESS NOTES
"Physical Therapy Treatment/Discharge summary    Patient Name: Junior Fontanez  MRN: [id number]  Encounter date: 12/27/2024 PT Received On: 12/27/24  Time Calculation  Start Time: 1415  Stop Time: 1503  Time Calculation (min): 48 min  PT Therapeutic Procedures Time Entry  Therapeutic Exercise Time Entry: 45    Visit # 10 of 20+  Visits/Dates Authorized: 60  10/23/24: MMO - NO AUTH / OOP not met / $20 copay / 60V - 0 USED / AVAILITY [id number]    Current Problem:   Problem List Items Addressed This Visit             ICD-10-CM    Cervical radiculopathy M54.12       Surgery: s/p C3 laminectomy for cord decompression and C4-C5 laminoplasty with osteoplastic reconstruction of  the posterior elements using laminoplasty plates on 9/30/24  Surgery date: 9/30/24    Precautions: 12/11/24 no longer under precautions, \"MD said use common sense.\"  2mos:   Current post op restrictions in place:   Lifting restrictions: 8.5 lb  Modified cervical AROM - avoid end ranges       Subjective   General:    Pt states his R leg feels like it has improved a lot, he does not notice a huge improvement in his RUE.     Pre-Treatment Symptoms:   Pain Assessment: 0-10  0-10 (Numeric) Pain Score: 0 - No pain    Objective   Findings:      SLS: R: 19 seconds L: 28 seconds     Treatments:      Therapeutic Exercise 58431:      elliptical L3-4 5min  Sit to stand w/ FT row 7.5# 2x10  DLS foam w/ weighted wand bicep curl x 15, shoulder flexion x 10, overhead press x 10 9#  Sit to stand w/ overhead medicine ball press 18# 2x10, STS with 10-18# 2x12- low bench  Step ups RLE 7in fwd/lateral/rev x5 each  Heel raise 2x10  Wall lean ankle DF x 15  Heel raise side step 10 feet x4 L/R  Tband sidesteps green 2 laps  Tband monsters F/B- green 2 laps   Chest press 7.5-10#, lat pull down 15-20#, lawnmower pull/rows 10#: 2x10 for all  Shuttle hip ext 25# 2x12  Weighted step ups 1 UE assist, 10#, 7in step  Wall slide 6# shoulder press using wall- RUE only 2x10  Reverse " "flies 5#, horiz abd 5#, low to high Y's 5#, chest flies 3#  Hip 90/90 switches- very limited  1/2 kneel KB hip openers 18#  Lateral raises ss with shoulder press  Shuttle leg press 87.5#- 187.5# x10ea  Deferred:  airdyne   Tband hip extension green 1x15 R/L   Gastrco/soleus stretches 15-20 sec x 1 ea  Leg curl 50# 2x10      *2.5# ankle weight for proprioception    Quadruped cervical retractions and rotations  Quadruped scapular prot/ret- minimal scap mvmt, lacks control  Lat Pulldowns 20#  Straight arm pulldowns (7.5#)  Single arm pullbacks (10#)  Shoulder Flexion 7.5#  Shoulder Extension (7.5#)  Wall angels  Open books  T's- scapular winging    STS with 18# med ball  Step ups F/L, 7in no UE x10 ea holding 18# weight  Eccentric lowering/heel taps 3in, 1 UE x15, controlled unlock on R knee, controlled descent    Neuromuscular Re-Ed 27151:         Hurdles 6\" , fwd/lateral x 5 each no UE support  Resisted gait lateral 10# x 4 L/R  RB balance AP w/ arm raise x 10, ML w/ lateral raise x 10  SLS L/R  DLS foam EO/EC/ EC w/ head turns L/R  Toe taps 5 inch x 20  Median nerve glides x 15 RUE    HEP / Access Codes:   Updated HEP given today on discharge  Assessment:    Pt continues to work on UE/LE strengthening and coordination/balance drills to improve his function. Pt still weak in his R hand/LE. Pt back to work and driving now. Did better this visit with single leg balance.     Post-Treatment Symptoms:    Response to Interventions: No change in pain     Plan: Continue with HEP at home, pt will join a gym as well.      Therapy options: will be seen for skilled physical therapy services  Planned modality interventions: electrical stimulation/Russian stimulation and TENS  Planned therapy interventions: balance/weight-bearing training, body mechanics training, functional ROM exercises, home exercise program, gait training, manual therapy, neuromuscular re-education, strengthening, stretching and soft tissue " "mobilization  Frequency: 2x week  Duration in visits: 10+    Goals:   Resolved       PT Problem       Driving Goal (Met)       Start:  10/24/24    Expected End:  12/31/24    Resolved:  12/27/24    Pt will demonstrate at least 45 deg cervical rotation R/L to allow for ability to safely drive with minimal compensation from trunk         Leg Symmetry Goal (Met)       Start:  10/24/24    Expected End:  12/31/24    Resolved:  12/27/24    Pt will demonstrate strength of Right LE (hip/knee/ankle) that is symmetrical to Left to allow for smooth, fluid movements and minimize gait deviations and compensations         Gait Goal (Met)       Start:  10/24/24    Expected End:  12/31/24    Resolved:  12/27/24    Pt will demonstrate a normal gait pattern with consistent Right foot clearance and improved arm swing         Balance Goal (Met)       Start:  10/24/24    Expected End:  12/31/24    Resolved:  12/27/24    Pt will improve single leg balance without UE support from 3\" to 10\" to improve score on 4 Stage Balance Test thereby reducing falls risk                 "

## 2025-07-18 ENCOUNTER — APPOINTMENT (OUTPATIENT)
Dept: PRIMARY CARE | Facility: CLINIC | Age: 60
End: 2025-07-18
Payer: COMMERCIAL

## 2025-07-18 VITALS
HEIGHT: 70 IN | SYSTOLIC BLOOD PRESSURE: 134 MMHG | WEIGHT: 186 LBS | DIASTOLIC BLOOD PRESSURE: 80 MMHG | BODY MASS INDEX: 26.63 KG/M2

## 2025-07-18 DIAGNOSIS — Z00.00 ANNUAL PHYSICAL EXAM: ICD-10-CM

## 2025-07-18 DIAGNOSIS — G47.34 NOCTURNAL HYPOXIA: ICD-10-CM

## 2025-07-18 PROCEDURE — 3079F DIAST BP 80-89 MM HG: CPT | Performed by: INTERNAL MEDICINE

## 2025-07-18 PROCEDURE — 3075F SYST BP GE 130 - 139MM HG: CPT | Performed by: INTERNAL MEDICINE

## 2025-07-18 PROCEDURE — 99396 PREV VISIT EST AGE 40-64: CPT | Performed by: INTERNAL MEDICINE

## 2025-07-18 PROCEDURE — 3008F BODY MASS INDEX DOCD: CPT | Performed by: INTERNAL MEDICINE

## 2025-07-18 NOTE — PROGRESS NOTES
Subjective   Patient ID: Wilmer Fontanez is a 59 y.o. male who presents for Annual Exam (Physical ).    HPI   Patient is here for physical  Still struggling from the surgery on the neck from last year.  Gait is still ataxic.  Recovery of symptoms is very limited  He had an sleep apnea test which showed mild obstructive sleep apnea    Past recap  Patient is here for hospital follow-up  He had neck surgery for cervical radiculopathy  He says he is recovering but his symptoms are not better yet which she was told that he may not recover the full function  Wound is healing but he has not started therapy  His blood pressure was running high in the hospital added more medications  He is here for follow-up  His calcium was low as well    Past recap  Patient is here for MRI of the C-spine and lumbosacral spine  His symptoms of right arm weakness and numbness and right leg weakness is progressively getting worse    Patient is here for follow-up on x-ray of C-spine and lumbar spine  Follow-up on EMG test  Patient is getting worsening of the numbness and weakness of the right arm and the right leg.  It started few months ago but progressively is getting worse.  He is limping a little.  The numbness shoots down the right arm.  He denies any trauma to the spine    Past recap  Patient is here for follow-up on blood work  Feels very tired  Per wife he has OCD and anxiety  Having a lot of pain in the neck and the back  Gets numbness in the arm and the foot     patient is here for physical  Complaining of numbness in the right arm and the right hand and complaining of numbness in the right foot sometimes dragging  Colonoscopy due next year    Patient is here for follow-up on 2D echo follow-up on blood work   Follow-up on lung CT  He has quit smoking for now 34 days  Complains of feeling fatigued gets lack of energy shortness of breath numbness and tingling in the hands and arms      patient here for physical     PMH :  "tonsillectomy, left bundle branch block, scoliosis  SH: smoker , no etoh  FH : M CANCER , F NHL , GF DM   Review of Systems    Objective   /80   Ht 1.778 m (5' 10\")   Wt 84.4 kg (186 lb)   BMI 26.69 kg/m²     Physical Exam  Vitals reviewed.   Constitutional:       Appearance: Normal appearance.   HENT:      Head: Normocephalic and atraumatic.      Right Ear: Tympanic membrane, ear canal and external ear normal.      Left Ear: Tympanic membrane, ear canal and external ear normal.      Nose: Nose normal.      Mouth/Throat:      Pharynx: Oropharynx is clear.     Eyes:      Extraocular Movements: Extraocular movements intact.      Conjunctiva/sclera: Conjunctivae normal.      Pupils: Pupils are equal, round, and reactive to light.       Cardiovascular:      Rate and Rhythm: Normal rate and regular rhythm.      Pulses: Normal pulses.      Heart sounds: Normal heart sounds.   Pulmonary:      Effort: Pulmonary effort is normal.      Breath sounds: Normal breath sounds.   Abdominal:      General: Abdomen is flat. Bowel sounds are normal.      Palpations: Abdomen is soft.     Musculoskeletal:      Cervical back: Normal range of motion and neck supple.      Comments: Scoliosis     Skin:     General: Skin is warm and dry.     Neurological:      General: No focal deficit present.      Mental Status: He is alert and oriented to person, place, and time.      Motor: Weakness present.      Coordination: Coordination abnormal.      Gait: Gait abnormal.      Comments: Reflexes hyper   Psychiatric:         Mood and Affect: Mood normal.         Assessment/Plan   Problem List Items Addressed This Visit    None        Past recap   physical normal  Blood work ordered  Tdap not available follow-up to get it  Scheduled for an eye examination next week  Had colonoscopy at 49  Discussed diet exercise and healthy lifestyle     7/17/2023  Physical normal   EKG done shows right bundle branch block  Discussed with the patient that he " needs to rule out sleep apnea needs rule out lung issues  We will do CT cardiac scoring  We will do CT lung cancer screening as he had lung nodules in the past  Check 2D echo  Routine blood work ordered  Encouraged to quit smoking  Follow-up after the test    8/26/2023  2D echo shows moderate pulmonary regurg slightly reduced ejection fraction  That could explain right bundle branch block  Advised patient to go for sleep study  CAT scan shows emphysema and upper lung fields congratulated for quitting smoking  We will do nuclear pharmacological stress test  Repeat blood pressure 120/72  Will refer to cardiology for further evaluation    11/18/2023  Blood work reviewed  Stress test normal  Mild obstructive sleep apnea  Vitamin D low at 17  Vitamin D supplement started  Mild upper lobe emphysema  Patient has quit smoking,  Follow-up for routine physical next year    7/20/2024  Physical normal  Routine blood work ordered CBC be fasting with TSH vitamin D was low before  Check PSA  Patient's symptoms of right arm numbness could be related to scoliosis  Will do EMG right arm and right leg to evaluate further  Follow-up after blood work and EMG    7/27/2024  Blood work reviewed  All blood work in normal range  No electrolyte deficiency to explain numbness or tingling in the arms and legs  Vitamin D deficiency  Vitamin D 50,000 q. weekly for 3 months then 2000 a day  X-ray C-spine thoracic spine lumbar spine  EMG  Follow-up after the test    8/24/2024  C-spine and lumbosacral spine shows advanced discogenic degenerative changes  Patient's neurological examination is worse than last time  Will get MRI of C-spine and lumbosacral spine in view of abnormal x-rays  Hyperreflexic reflexes  EMG also shows possibility of radiculopathy in the cervical area and lumbar area  Follow-up after the MRI    9/26/2024  MRI of C-spine and MRI of lumbosacral spine reviewed  EMG results reviewed  Patient has cervical spinal cord compression  stenosis   Call the on-call neurosurgery team  She recommended patient to be transferred to [facility] to be able to operate on Monday  Patient refused  Will refer patient to a [facility] ER for evaluation of neurosurgery to see if patient needs to be inpatient or can be treated outpatient    10/9/2024  Patient is doing very well postoperatively  Refer patient to physical therapy for cervical radiculopathy  Blood pressure is doing well on the thyroid medication  Will check CBC CMP  Patient had low calcium will check ionized calcium and vitamin D    7/19/2025  Physical normal except for ataxic gait  Routine blood work ordered  Renal patient has a follow-up with neurosurgery  Explained that not sure if he is can recover all the function  Sleep study reviewed patient's oxygen did drop to 87%  Encouraged him to do overnight pulse oximetry as he may need oxygen  2D echo results in the past had shown mild to moderate pulmonary regurg  Clinically patient is asymptomatic will repeat echo next year  Forms filled for the insurance

## 2025-07-19 LAB
ALBUMIN SERPL-MCNC: 4.5 G/DL (ref 3.6–5.1)
ALP SERPL-CCNC: 67 U/L (ref 35–144)
ALT SERPL-CCNC: 19 U/L (ref 9–46)
ANION GAP SERPL CALCULATED.4IONS-SCNC: 9 MMOL/L (CALC) (ref 7–17)
AST SERPL-CCNC: 14 U/L (ref 10–35)
BILIRUB SERPL-MCNC: 0.6 MG/DL (ref 0.2–1.2)
BUN SERPL-MCNC: 10 MG/DL (ref 7–25)
CALCIUM SERPL-MCNC: 9.3 MG/DL (ref 8.6–10.3)
CHLORIDE SERPL-SCNC: 105 MMOL/L (ref 98–110)
CHOLEST SERPL-MCNC: 155 MG/DL
CHOLEST/HDLC SERPL: 3.2 (CALC)
CO2 SERPL-SCNC: 24 MMOL/L (ref 20–32)
CREAT SERPL-MCNC: 0.93 MG/DL (ref 0.7–1.35)
EGFRCR SERPLBLD CKD-EPI 2021: 94 ML/MIN/1.73M2
ERYTHROCYTE [DISTWIDTH] IN BLOOD BY AUTOMATED COUNT: 12.3 % (ref 11–15)
GLUCOSE SERPL-MCNC: 98 MG/DL (ref 65–99)
HCT VFR BLD AUTO: 44.6 % (ref 38.5–50)
HDLC SERPL-MCNC: 48 MG/DL
HGB BLD-MCNC: 15 G/DL (ref 13.2–17.1)
LDLC SERPL CALC-MCNC: 89 MG/DL (CALC)
MCH RBC QN AUTO: 28.8 PG (ref 27–33)
MCHC RBC AUTO-ENTMCNC: 33.6 G/DL (ref 32–36)
MCV RBC AUTO: 85.8 FL (ref 80–100)
NONHDLC SERPL-MCNC: 107 MG/DL (CALC)
PLATELET # BLD AUTO: 249 THOUSAND/UL (ref 140–400)
PMV BLD REES-ECKER: 10.3 FL (ref 7.5–12.5)
POTASSIUM SERPL-SCNC: 4.7 MMOL/L (ref 3.5–5.3)
PROT SERPL-MCNC: 7.2 G/DL (ref 6.1–8.1)
PSA SERPL-MCNC: 1.92 NG/ML
RBC # BLD AUTO: 5.2 MILLION/UL (ref 4.2–5.8)
SODIUM SERPL-SCNC: 138 MMOL/L (ref 135–146)
TRIGL SERPL-MCNC: 88 MG/DL
TSH SERPL-ACNC: 0.9 MIU/L (ref 0.4–4.5)
VIT B12 SERPL-MCNC: 307 PG/ML (ref 200–1100)
WBC # BLD AUTO: 10.7 THOUSAND/UL (ref 3.8–10.8)

## (undated) DEVICE — DRAPE, INSTRUMENT, W/POUCH, STERI DRAPE, 7 X 11 IN, DISPOSABLE, STERILE

## (undated) DEVICE — PENCIL, ELECTROSURG (BOVIE) FOOT SWITCH, LONGER CORD

## (undated) DEVICE — TAPE, SILK, DURAPORE, 3 IN X 10 YD, LF

## (undated) DEVICE — SUTURE, NUROLON, 4-0, 18 IN, TF, CONTROL RELEASE, MULTIPACK, BLACK

## (undated) DEVICE — CATHETER TRAY, SURESTEP, 16FR, URINE METER W/STATLOCK

## (undated) DEVICE — DRILL BIT, LEVERAGE, 5MM

## (undated) DEVICE — NEEDLE, HYPODERMIC, 25 G X 1.5 IN, A BEVEL, STERILE

## (undated) DEVICE — DRILL BIT, LEVERAGE, 7MM

## (undated) DEVICE — FRAZIER SUCTION, 10 FR.

## (undated) DEVICE — SUTURE, PROLENE, 3-0, 30 IN, FSL, BLUE

## (undated) DEVICE — TIP, SUCTION, FRAZIER, W/CONTROL VENT, 12 FR

## (undated) DEVICE — PREP TRAY, SKIN, DRY, STANDARD

## (undated) DEVICE — SUTURE, VICRYL, 1, 27 IN, CT-1, VIOLET

## (undated) DEVICE — SPONGE, GAUZE, XRAY DECT, 16 PLY, 4 X 4, W/MASTER DMT,STERILE

## (undated) DEVICE — EVACUATOR, WOUND, CLOSED, 3 SPRING, 400 CC, Y CONNECTING TUBE

## (undated) DEVICE — ELECTRODE, ELECTROSURGICAL, BLADE EXT 4 INCH, INSULATED

## (undated) DEVICE — SYRINGE, LUER LOCK, 12ML

## (undated) DEVICE — TUBING, SUCTION, CONNECTING, STERILE 0.25 X 120 IN., LF

## (undated) DEVICE — DRAIN, WOUND, ROUND, W/TROCAR, HOLE PATTERN, 10 IN, MEDIUM/LARGE, 3/16 X 49 IN

## (undated) DEVICE — DRAPE, INCISE, ANTIMICROBIAL, IOBAN 2, LARGE, 17 X 23 IN, DISPOSABLE, STERILE

## (undated) DEVICE — TIP, SUCTION, FRAZIER, W/CONTROL VENT, 10 FR

## (undated) DEVICE — SUTURE, VICRYL PLUS, 0, 8X18IN, CT-1, UND, BRAIDED

## (undated) DEVICE — STAPLER, SKIN, PLUS, REGULAR, 35

## (undated) DEVICE — MARKER, SKIN, DUAL TIP INK W/9 LABEL AND REMOVABLE TIME OUT SLEEVE

## (undated) DEVICE — SEALER, BIPOLAR, AQUA MANTYS 6.0

## (undated) DEVICE — Device

## (undated) DEVICE — CATHETER, IV, ANGIOCATH, 14 G X 1.88 IN, FEP POLYMER

## (undated) DEVICE — SUTURE, VICRYL, 4-0, 18 IN, PS2, UNDYED

## (undated) DEVICE — DRAPE, C-ARM, W/12 IN COVER, LI XTRAY TUBE

## (undated) DEVICE — PAD, GROUNDING, ELECTROSURGICAL, W/9 FT CABLE, POLYHESIVE II, ADULT, LF